# Patient Record
Sex: FEMALE | Race: WHITE | NOT HISPANIC OR LATINO | Employment: FULL TIME | URBAN - METROPOLITAN AREA
[De-identification: names, ages, dates, MRNs, and addresses within clinical notes are randomized per-mention and may not be internally consistent; named-entity substitution may affect disease eponyms.]

---

## 2020-10-15 PROBLEM — N91.1 SECONDARY AMENORRHEA: Status: ACTIVE | Noted: 2020-10-15

## 2020-10-15 PROBLEM — F41.9 ANXIETY: Status: ACTIVE | Noted: 2020-10-15

## 2020-11-05 PROBLEM — M54.9: Status: ACTIVE | Noted: 2020-11-05

## 2020-11-05 PROBLEM — O26.891: Status: ACTIVE | Noted: 2020-11-05

## 2020-11-09 PROBLEM — Z11.3 SCREENING FOR STDS (SEXUALLY TRANSMITTED DISEASES): Status: ACTIVE | Noted: 2020-11-09

## 2020-11-09 PROBLEM — N91.1 SECONDARY AMENORRHEA: Status: RESOLVED | Noted: 2020-10-15 | Resolved: 2020-11-09

## 2020-11-09 PROBLEM — Z34.01 ENCOUNTER FOR SUPERVISION OF NORMAL FIRST PREGNANCY IN FIRST TRIMESTER: Status: ACTIVE | Noted: 2020-11-09

## 2020-11-12 PROBLEM — O99.340 ANXIETY DURING PREGNANCY: Status: ACTIVE | Noted: 2020-10-15

## 2020-11-12 PROBLEM — Z3A.13 13 WEEKS GESTATION OF PREGNANCY: Status: ACTIVE | Noted: 2020-11-09

## 2020-11-30 PROBLEM — Z34.02 ENCOUNTER FOR PRENATAL CARE OF FIRST PREGNANCY, SECOND TRIMESTER: Status: ACTIVE | Noted: 2020-11-30

## 2020-12-30 PROBLEM — R41.841 COGNITIVE COMMUNICATION DEFICIT: Status: ACTIVE | Noted: 2019-09-18

## 2020-12-30 PROBLEM — F41.9 ANXIETY: Status: ACTIVE | Noted: 2020-12-30

## 2020-12-30 PROBLEM — Z3A.13 13 WEEKS GESTATION OF PREGNANCY: Status: RESOLVED | Noted: 2020-11-09 | Resolved: 2020-12-30

## 2020-12-30 PROBLEM — R41.841 COGNITIVE COMMUNICATION DEFICIT: Status: RESOLVED | Noted: 2019-09-18 | Resolved: 2020-12-30

## 2020-12-30 PROBLEM — G43.009 MIGRAINE WITHOUT AURA AND WITHOUT STATUS MIGRAINOSUS, NOT INTRACTABLE: Status: ACTIVE | Noted: 2019-11-25

## 2020-12-30 PROBLEM — G44.329 CHRONIC POST-TRAUMATIC HEADACHE, NOT INTRACTABLE: Status: ACTIVE | Noted: 2019-11-06

## 2020-12-30 PROBLEM — Z11.3 SCREENING FOR STDS (SEXUALLY TRANSMITTED DISEASES): Status: RESOLVED | Noted: 2020-11-09 | Resolved: 2020-12-30

## 2021-02-26 PROBLEM — Z34.03 ENCOUNTER FOR PRENATAL CARE IN THIRD TRIMESTER OF FIRST PREGNANCY: Status: ACTIVE | Noted: 2020-11-30

## 2021-05-24 PROBLEM — Z3A.40 40 WEEKS GESTATION OF PREGNANCY: Status: ACTIVE | Noted: 2021-05-24

## 2021-05-26 PROBLEM — Z98.891 S/P PRIMARY LOW TRANSVERSE C-SECTION: Status: ACTIVE | Noted: 2021-05-26

## 2021-09-23 PROBLEM — J01.40 ACUTE NON-RECURRENT PANSINUSITIS: Status: ACTIVE | Noted: 2021-09-23

## 2021-10-20 PROBLEM — R63.5 WEIGHT GAIN: Status: ACTIVE | Noted: 2021-10-20

## 2021-10-20 PROBLEM — Z34.03 ENCOUNTER FOR PRENATAL CARE IN THIRD TRIMESTER OF FIRST PREGNANCY: Status: RESOLVED | Noted: 2020-11-30 | Resolved: 2021-10-20

## 2022-03-09 PROBLEM — J06.9 UPPER RESPIRATORY INFECTION, ACUTE: Status: ACTIVE | Noted: 2022-03-09

## 2022-06-14 PROBLEM — J02.9 SORE THROAT: Status: ACTIVE | Noted: 2022-06-14

## 2022-09-20 NOTE — PATIENT INSTRUCTIONS
Nutrition Tips for Relief of Diarrhea   WHAT YOU NEED TO KNOW:   What are nutrition tips for relief of diarrhea? There are diet changes you can make to help relieve or stop diarrhea  These changes include limiting or avoiding foods and liquids that are high in sugar, fat, fiber, and lactose  Lactose is a sugar found in milk products  Milk products can cause diarrhea in people who are lactose intolerant  You should also drink extra liquids to replace fluids that are lost when you have diarrhea  Diarrhea can lead to dehydration  Which foods and liquids should I limit or avoid? Dairy:      Whole milk    Half-and-half, cream, and sour cream    Regular (whole milk) ice cream    Grains:      Whole wheat and whole grain breads, pasta, cereals, and crackers    Brown and wild rice    Breads and cereals with seeds or nuts    Popcorn    Fruit and vegetables: All raw fruits, except bananas and melon    Dried fruits, including prunes and raisins    Canned fruit in heavy syrup    Prune juice and any fruit juice with pulp    Raw vegetables, except lettuce     Fried vegetables    Corn, raw and cooked broccoli, cabbage, cauliflower, and brandt greens    Protein:      Fried meat, poultry, and fish    High-fat luncheon meats, such as bologna    Fatty meats, such as sausage, dodson, and hot dogs    Beans and nuts    Liquids:      Sodas and fruit-flavored drinks    Drinks that contain caffeine, such as energy drinks, coffee, and tea     Drinks that contain alcohol or sugar alcohol, such as sorbitol    Which foods and liquids may I eat and drink? Most people can tolerate the foods and liquids listed below  If any of them make your symptoms worse, stop eating or drinking them until you feel better  If you are lactose intolerant, avoid milk products    Dairy:      Skim or low-fat milk or evaporated milk    Soy milk or buttermilk     Low-fat, part-skim, and aged cheese    Yogurt, low-fat ice cream, or sherbert    Grains:  (Choose foods with less than 2 grams of dietary fiber per serving )     White or refined flour breads, bagels, pasta, and crackers    Cold or hot cereals made from white or refined flour such as puffed rice, cornflakes, or cream of wheat    White rice    Fruit and vegetables:      Bananas or melon    Fruit juice without pulp, except prune juice    Canned fruit in juice or light syrup    Lettuce and most well-cooked vegetables without seeds or skins     Strained vegetable juice    Protein:      Tender, well-cooked meat, poultry, or fish    Well-cooked eggs or soy foods (cooked without added fat)    Smooth nut butters    Fats:  (Limit fats to less than 8 teaspoons a day)     Oil, butter, or margarine, or mayonnaise    Cream cheese or salad dressings    Liquids: For infants, breast milk or formula    Oral rehydration solution     Decaffeinated coffee or caffeine-free teas    Soft drinks without caffeine    What other guidelines should I follow? Drink liquids as directed  You may need to drink more liquids than usual to prevent dehydration  Ask how much liquid to drink each day and which liquids are best for you  You may need to drink an oral rehydration solution (ORS)  An ORS helps replace fluids and electrolytes that you lose when you have diarrhea  Eat small meals or snacks every 3 to 4 hours  instead of large meals  Continue eating even if you still have diarrhea  Your diarrhea will continue for a few days but should gradually go away  CARE AGREEMENT:   You have the right to help plan your care  Discuss treatment options with your healthcare provider to decide what care you want to receive  You always have the right to refuse treatment  The above information is an  only  It is not intended as medical advice for individual conditions or treatments  Talk to your doctor, nurse or pharmacist before following any medical regimen to see if it is safe and effective for you    © Copyright [employer] 2022 Information is for End User's use only and may not be sold, redistributed or otherwise used for commercial purposes  All illustrations and images included in CareNotes® are the copyrighted property of [employer] M [employer] Inc  or [employer] [facility]  Pharyngitis   WHAT YOU NEED TO KNOW:   What is pharyngitis? Pharyngitis, or sore throat, is inflammation of the tissues and structures in your pharynx (throat)  Pharyngitis is most often caused by bacteria  It may also be caused by a cold or flu virus  Other causes include smoking, allergies, or acid reflux  What signs and symptoms may occur with pharyngitis? Sore throat or pain when you swallow    Fever, chills, and body aches    Hoarse or raspy voice    Cough, runny or stuffy nose, itchy or watery eyes    Headache    Upset stomach and loss of appetite    Mild neck stiffness    Swollen glands that feel like hard lumps when you touch your neck    White and yellow pus-filled blisters in the back of your throat    How is pharyngitis diagnosed? Tell your healthcare provider about your symptoms  He may look inside your throat and feel your neck  You may also need the following tests:  A throat culture  may show which germ is causing your sore throat  A cotton swab is rubbed against the back of your throat  Blood tests  may be used to show if another medical condition is causing your sore throat  How is pharyngitis treated? Viral pharyngitis will go away on its own without treatment  Your sore throat should start to feel better in 3 to 5 days for both viral and bacterial infections  You may need any of the following:  Antibiotics  treat a bacterial infection  NSAIDs , such as ibuprofen, help decrease swelling, pain, and fever  NSAIDs can cause stomach bleeding or kidney problems in certain people  If you take blood thinner medicine, always ask your healthcare provider if NSAIDs are safe for you  Always read the medicine label and follow directions      Acetaminophen decreases pain and fever  It is available without a doctor's order  Ask how much to take and how often to take it  Follow directions  Acetaminophen can cause liver damage if not taken correctly  How can I manage my symptoms? Gargle salt water  Mix ¼ teaspoon salt in an 8 ounce glass of warm water and gargle  This may help decrease swelling in your throat  Drink liquids as directed  You may need to drink more liquids than usual  Liquids may help soothe your throat and prevent dehydration  Ask how much liquid to drink each day and which liquids are best for you  Use a cool-steam humidifier  to help moisten the air in your room and decrease your cough  Soothe your throat  with cough drops, ice, soft foods, or popsicles  How can I prevent the spread of pharyngitis? Cover your mouth and nose when you cough or sneeze  Do not share food or drinks  Wash your hands often  Use soap and water  If soap and water are unavailable, use an alcohol based hand   Call 911 for any of the following: You have trouble breathing or swallowing because your throat is swollen or sore  When should I seek immediate care? You are drooling because it hurts too much to swallow  Your fever is higher than 102? F (39?C) or lasts longer than 3 days  You are confused  You taste blood in your throat  When should I contact my healthcare provider? Your throat pain gets worse  You have a painful lump in your throat that does not go away after 5 days  Your symptoms do not improve after 5 days  You have questions or concerns about your condition or care  CARE AGREEMENT:   You have the right to help plan your care  Learn about your health condition and how it may be treated  Discuss treatment options with your healthcare providers to decide what care you want to receive  You always have the right to refuse treatment  The above information is an  only   It is not intended as medical advice for individual conditions or treatments  Talk to your doctor, nurse or pharmacist before following any medical regimen to see if it is safe and effective for you  © Copyright [employer] 2022 Information is for End User's use only and may not be sold, redistributed or otherwise used for commercial purposes  All illustrations and images included in CareNotes® are the copyrighted property of [employer]  or [employer]   Migraine Headache   WHAT YOU NEED TO KNOW:   What is a migraine headache? A migraine is a severe headache  The pain can be so severe that it interferes with your daily activities  A migraine can last a few hours up to several days  The exact cause of migraines is not known  A family history of migraines increases your risk  Your risk is also higher if you are a woman or take medicines such as estrogen or a vasodilator  What are the warning signs that a migraine headache is about to start? Warning signs usually start 15 to 60 minutes before the headache:  Visual changes (auras), such as blurred vision, temporary blind or bright spots, lines, or hallucinations    Unusual tiredness or frequent yawning    Tingling in an arm or leg    What are the signs and symptoms of a migraine headache? A migraine headache usually begins as a dull ache around the eye or temple  The pain may get worse with movement  You may also have the following:  Pain in your head that may increase to the point that you cannot do everyday activities    Pain on one or both sides of your head    Throbbing, pulsing, or pounding pain in your head    Nausea and vomiting    Sensitivity to light, noise, or smells    What can trigger a migraine headache?    Stress, eye strain, oversleeping, or not getting enough sleep    Hormone changes in women from birth control pills, pregnancy, menopause, or during a monthly period    Skipping meals, going too long without eating, or not drinking enough liquids    Certain foods or drinks such as chocolate, hard cheese, alcohol, or drinks that contain caffeine    Foods that contain gluten, nitrates, MSG, or artificial sweeteners    Sunlight, bright or flashing lights, loud noises, smoke, or strong smells    Heat, humidity, or changes in the weather    How is a migraine headache diagnosed? Your healthcare provider will ask about your headaches  Describe the pain and any other symptoms, such as nausea  Tell the provider if you think anything triggered the pain  The provider will also want to know what you ate and drank before the pain started  Tell the provider about any medical conditions you have or that run in your family  Include any recent stressors you have had  You may also need any of the following:  A neurologic exam  is used to check how your pupils react to light  Your healthcare provider may check your memory, hand grasp, and balance  CT or MRI pictures  may be taken of your brain  You may be given contrast liquid to help your brain show up better in the pictures  Tell the healthcare provider if you have ever had an allergic reaction to contrast liquid  Do not enter the MRI room with anything metal  Metal can cause serious injury  Tell the healthcare provider if you have any metal in or on your body  How is a migraine headache treated? Migraines cannot be cured  The goal of treatment is to reduce your symptoms  Medicines  may be given to help you manage migraines  Take medicine as soon as you feel a migraine begin, or as directed  Your healthcare provider may recommend any of the following:    Migraine medicines  are used to help prevent or stop a migraine  NSAIDs  help decrease swelling and pain or fever  This medicine is available with or without a doctor's order  NSAIDs can cause stomach bleeding or kidney problems in certain people  If you take blood thinner medicine, always ask your healthcare provider if NSAIDs are safe for you   Always read the medicine label and follow directions  Acetaminophen  decreases pain and fever  It is available without a doctor's order  Ask how much to take and how often to take it  Follow directions  Read the labels of all other medicines you are using to see if they also contain acetaminophen, or ask your doctor or pharmacist  Acetaminophen can cause liver damage if not taken correctly  Do not use more than 4 grams (4,000 milligrams) total of acetaminophen in one day  Prescription pain medicine  may be given  Ask your healthcare provider how to take this medicine safely  Some prescription pain medicines contain acetaminophen  Do not take other medicines that contain acetaminophen without talking to your healthcare provider  Too much acetaminophen may cause liver damage  Prescription pain medicine may cause constipation  Ask your healthcare provider how to prevent or treat constipation  Antinausea medicine  may be given to calm your stomach and to help prevent vomiting  This medicine can also help relieve pain  Cognitive behavior therapy (CBT)  can help you learn ways to manage and prevent migraines  A therapist can teach you to relax and to reduce stress  You may learn ways to create healthy nutrition, activity, and sleep habits to prevent migraines  The therapist can also help you manage conditions that can affect migraines, such as anxiety or depression  What can I do to manage my symptoms? Rest in a dark, quiet room  This will help decrease your pain  Sleep may also help relieve the pain  Apply ice to decrease pain  Use an ice pack, or put crushed ice in a plastic bag  Cover the ice pack with a towel and place it on your head  Apply ice for 15 to 20 minutes every hour  Apply heat to decrease pain and muscle spasms  Use a small towel dampened with warm water or a heating pad, or sit in a warm bath  Apply heat on the area for 20 to 30 minutes every 2 hours  You may alternate heat and ice  Keep a migraine record  Write down when your migraines start and stop  Include your symptoms and what you were doing when a migraine began  Record what you ate or drank for 24 hours before the migraine started  Keep track of what you did to treat your migraine and if it worked  Bring the migraine record with you to visits with your healthcare provider  What can I do to prevent another migraine headache? Prevent a medicine overuse headache  Take pain medicines only as long as directed  A medicine may be limited to a certain amount each month  Your healthcare provider can help you create a plan so you get a safe amount each month  Do not smoke  Nicotine and other chemicals in cigarettes and cigars can trigger a migraine or make it worse  Ask your healthcare provider for information if you currently smoke and need help to quit  E-cigarettes or smokeless tobacco still contain nicotine  Talk to your healthcare provider before you use these products  Do not drink alcohol  Alcohol can trigger a migraine  It can also keep medicines used to treat your migraines from working  Be physically active  Physical activity, such as exercise, may help prevent migraines  Talk to your healthcare provider about the best activity plan for you  Try to get at least 30 minutes of physical activity on most days  Manage stress  Stress may trigger a migraine  Learn new ways to relax, such as deep breathing  Create a sleep schedule  Go to bed and get up at the same times each day  Do not watch television before bed  Eat a variety of healthy foods  Include healthy foods such as include fruit, vegetables, whole-grain breads, low-fat dairy products, beans, lean meat, and fish  Do not have food or drinks that trigger your migraines  Drink more liquids to prevent dehydration  Your healthcare provider can tell you how much liquid to drink each day and which liquids are best for you      Call your local emergency number (623 in the [place]) or have someone call if:   You feel like you are going to faint, you become confused, or you have a seizure  When should I seek immediate care? You have a headache that seems different or much worse than your usual migraine headache  You have a severe headache with a fever or a stiff neck  You have new problems with speech, vision, balance, or movement  When should I call my doctor? Your migraines interfere with your daily activities  Your medicines or treatments stop working  You have questions or concerns about your condition or care  CARE AGREEMENT:   You have the right to help plan your care  Learn about your health condition and how it may be treated  Discuss treatment options with your healthcare providers to decide what care you want to receive  You always have the right to refuse treatment  The above information is an  only  It is not intended as medical advice for individual conditions or treatments  Talk to your doctor, nurse or pharmacist before following any medical regimen to see if it is safe and effective for you  © Copyright [employer] 2022 Information is for End User's use only and may not be sold, redistributed or otherwise used for commercial purposes  All illustrations and images included in CareNotes® are the copyrighted property of A [employer] M , Inc  or [employer] [facility]  Acute Cough   WHAT YOU NEED TO KNOW:   What is an acute cough? An acute cough can last up to 3 weeks  Common causes of an acute cough include a cold, allergies, or a lung infection  How is the cause of an acute cough diagnosed? Your healthcare provider will examine you and listen to your lungs  Tell your healthcare provider if you cough up any mucus, or have a fever or shortness of breath  Also tell your provider what makes the cough better or worse  Depending on your symptoms, you may need a chest x-ray  A sample of mucus may be collected and tested for infection    How is an acute cough treated? An acute cough usually goes away on its own  Ask your healthcare provider about medicines you can take to decrease your cough  You may need medicine to stop the cough, decrease swelling in your airways, or help open your airways  Medicine may also be given to help you cough up mucus  If you have an infection caused by bacteria, you may need antibiotics  What can I do to manage my cough? Do not smoke and stay away from others who smoke  Nicotine and other chemicals in cigarettes and cigars can cause lung damage and make your cough worse  Ask your healthcare provider for information if you currently smoke and need help to quit  E-cigarettes or smokeless tobacco still contain nicotine  Talk to your healthcare provider before you use these products  Drink extra liquids as directed  Liquids will help thin and loosen mucus so you can cough it up  Liquids will also help prevent dehydration  Examples of good liquids to drink include water, fruit juice, and broth  Do not drink liquids that contain caffeine  Caffeine can increase your risk for dehydration  Ask your healthcare provider how much liquid to drink each day  Rest as directed  Do not do activities that make your cough worse, such as exercise  Use a humidifier or vaporizer  Use a cool mist humidifier or a vaporizer to increase air moisture in your home  This may make it easier for you to breathe and help decrease your cough  Eat 2 to 5 mL of honey 2 times each day  Honey can help thin mucus and decrease your cough  Use cough drops or lozenges  These can help decrease throat irritation and your cough  When should I seek immediate care? You have trouble breathing or feel short of breath  You cough up blood, or you see blood in your mucus  You faint or feel weak or dizzy  You have chest pain when you cough or take a deep breath  You have new wheezing  When should I contact my healthcare provider?    You have a fever  Your cough lasts longer than 4 weeks  Your symptoms do not improve with treatment  You have questions or concerns about your condition or care  CARE AGREEMENT:   You have the right to help plan your care  Learn about your health condition and how it may be treated  Discuss treatment options with your healthcare providers to decide what care you want to receive  You always have the right to refuse treatment  The above information is an  only  It is not intended as medical advice for individual conditions or treatments  Talk to your doctor, nurse or pharmacist before following any medical regimen to see if it is safe and effective for you  © Copyright [employer] 2022 Information is for End User's use only and may not be sold, redistributed or otherwise used for commercial purposes  All illustrations and images included in CareNotes® are the copyrighted property of [employer]  or [facility]  Sinusitis   WHAT YOU NEED TO KNOW:   What is sinusitis? Sinusitis is inflammation or infection of your sinuses  Sinusitis is most often caused by a virus  Acute sinusitis may last up to 12 weeks  Chronic sinusitis lasts longer than 12 weeks  Recurrent sinusitis means you have 4 or more infections in 1 year  What increases my risk for sinusitis? Medical conditions, such as an upper respiratory infection, allergies, asthma, or cystic fibrosis    Dental infections or procedures, such as gum infections, tooth decay, or a root canal    Smoking or exposure to secondhand smoke    Abnormal sinus structure, such as nasal growths, swollen tonsils, or a deviated septum    A weak immune system, from diseases such as diabetes or HIV    What are the signs and symptoms of sinusitis?    Fever    Pain, pressure, redness, or swelling around the forehead, cheeks, or eyes    Thick yellow or green discharge from your nose    Tenderness when you touch your face over your sinuses    Dry cough that happens mostly at night or when you lie down    Headache and face pain that is worse when you lean forward    Tooth pain, or pain when you chew    How is sinusitis diagnosed? Your healthcare provider will examine you and ask about your symptoms  He or she may check inside your nose using a nasal speculum  You may need any of the following tests:  A sample of mucus from your nose  may show what germ is causing your infection  A CT or MRI of your head  may show the mucous lining of your sinuses  You may be given contrast liquid to help your sinuses show up better in the pictures  Tell your healthcare provider if you have ever had an allergic reaction to contrast liquid  Do not enter the MRI room with anything metal  Metal can cause serious injury  Tell your healthcare provider if you have any metal in or on your body  How is sinusitis treated? Your symptoms may go away on their own  Your healthcare provider may recommend watchful waiting for up to 10 days before starting antibiotics  You may need any of the following:  Acetaminophen  decreases pain and fever  It is available without a doctor's order  Ask how much to take and how often to take it  Follow directions  Read the labels of all other medicines you are using to see if they also contain acetaminophen, or ask your doctor or pharmacist  Acetaminophen can cause liver damage if not taken correctly  Do not use more than 4 grams (4,000 milligrams) total of acetaminophen in one day  NSAIDs , such as ibuprofen, help decrease swelling, pain, and fever  This medicine is available with or without a doctor's order  NSAIDs can cause stomach bleeding or kidney problems in certain people  If you take blood thinner medicine, always ask your healthcare provider if NSAIDs are safe for you  Always read the medicine label and follow directions  Nasal steroid sprays  may help decrease inflammation in your nose and sinuses      Decongestants  help reduce swelling and drain mucus in the nose and sinuses  They may help you breathe easier  Antihistamines  help dry mucus in the nose and relieve sneezing  Antibiotics  help treat or prevent a bacterial infection  How can I manage my symptoms? Rinse your sinuses as directed  Use a sinus rinse device to rinse your nasal passages with a saline (salt water) solution or distilled water  Do not use tap water  This will help thin the mucus in your nose and rinse away pollen and dirt  It will also help reduce swelling so you can breathe normally  Use a humidifier  to increase air moisture in your home  This may make it easier for you to breathe and help decrease your cough  Sleep with your head elevated  Place an extra pillow under your head before you go to sleep to help your sinuses drain  Drink liquids as directed  Ask your healthcare provider how much liquid to drink each day and which liquids are best for you  Liquids will thin the mucus in your nose and help it drain  Avoid drinks that contain alcohol or caffeine  Do not smoke, and avoid secondhand smoke  Nicotine and other chemicals in cigarettes and cigars can make your symptoms worse  Ask your healthcare provider for information if you currently smoke and need help to quit  E-cigarettes or smokeless tobacco still contain nicotine  Talk to your healthcare provider before you use these products  How can I help prevent the spread of germs? Wash your hands often with soap and water  Wash your hands after you use the bathroom, change a child's diaper, or sneeze  Wash your hands before you prepare or eat food  Stay away from people who are sick  Some germs spread easily and quickly through contact  When should I seek immediate care? You have trouble breathing or wheezing that is getting worse  You have a stiff neck, a fever, or a bad headache  You cannot open your eye       Your eyeball bulges out or you cannot move your eye  You are more sleepy than normal, or you notice changes in your ability to think, move, or talk  You have swelling of your forehead or scalp  When should I call my doctor? You have vision changes, such as double vision  Your eye and eyelid are red, swollen, and painful  Your symptoms do not improve or go away after 10 days  You have nausea and are vomiting  Your nose is bleeding  You have questions or concerns about your condition or care  CARE AGREEMENT:   You have the right to help plan your care  Learn about your health condition and how it may be treated  Discuss treatment options with your healthcare providers to decide what care you want to receive  You always have the right to refuse treatment  The above information is an  only  It is not intended as medical advice for individual conditions or treatments  Talk to your doctor, nurse or pharmacist before following any medical regimen to see if it is safe and effective for you  © Copyright [employer] 2022 Information is for End User's use only and may not be sold, redistributed or otherwise used for commercial purposes   All illustrations and images included in CareNotes® are the copyrighted property of A D A M , Inc  or [address] [place] [address]

## 2022-09-20 NOTE — PROGRESS NOTES
BMI Counseling: Body mass index is 29 8 kg/m²  The BMI is above normal  Nutrition recommendations include decreasing portion sizes, encouraging healthy choices of fruits and vegetables, decreasing fast food intake, consuming healthier snacks, limiting drinks that contain sugar, moderation in carbohydrate intake, increasing intake of lean protein, reducing intake of saturated and trans fat and reducing intake of cholesterol  Exercise recommendations include vigorous physical activity 75 minutes/week, exercising 3-5 times per week, obtaining a gym membership and strength training exercises  No pharmacotherapy was ordered  Rationale for BMI follow-up plan is due to patient being overweight or obese  Depression Screening and Follow-up Plan: Patient was screened for depression during today's encounter  They screened negative with a PHQ-2 score of 0  Assessment/Plan:         Problem List Items Addressed This Visit        Respiratory    Acute recurrent ethmoidal sinusitis     Doxycycline 100mg p o  bid x 10 days  Use Flonase as directed  Other    Diarrhea     With encouraged patient to use Imodium as needed over-the-counter for symptoms of diarrhea  Sore throat - Primary     Rapid strep done in house  Patient has history of strep  Patient to do warm salt water gargles, throat lozenges and warm tea and honey  Relevant Orders    POCT rapid strepA    Cough     OTC cough and cold medication as needed  Patient to use her inhaler as directed every 4-6 hours as needed for cough  Acute nonintractable headache     Tylenol and advil prn headache symptoms  Seasonal allergies     Patient to use Claritin 10 mg OTC daily during change of season  Subjective:      Patient ID: Ingrid Conrad is a 40 y o  female      Patient is a 40year old female present for cough, sore throat, nasal congestion, sinus Problem, sinus pain and pressure started about 1 5 weeks ago with headache  The following portions of the patient's history were reviewed and updated as appropriate:   Past Medical History:  She has a past medical history of Anemia, Concussion, Migraine, Urinary tract infection, and Varicella  ,  _______________________________________________________________________  Medical Problems:  does not have any pertinent problems on file ,  _______________________________________________________________________  Past Surgical History:   has a past surgical history that includes Finger surgery (Left, ); [place] tooth extraction (Bilateral); and pr  delivery only (N/A, 2021)  ,  _______________________________________________________________________  Family History:  family history includes Dementia in her maternal grandmother; Diabetes type II in her father; Heart attack in her paternal grandmother; Hyperlipidemia in her mother; No Known Problems in her sister; Stroke in her father; Transient ischemic attack in her father ,  _______________________________________________________________________  Social History:   reports that she has never smoked  She has never used smokeless tobacco  She reports current alcohol use  She reports that she does not use drugs  ,  _______________________________________________________________________  Allergies:  is allergic to penicillins     _______________________________________________________________________  Current Outpatient Medications   Medication Sig Dispense Refill    albuterol (PROVENTIL HFA,VENTOLIN HFA) 90 mcg/act inhaler INHALE 2 PUFFS EVERY 6 HOURS AS NEEDED FOR WHEEZING OR SHORTNESS OF BREATH FOR UP TO 30 DAYS        Etonogestrel (NEXPLANON SC) Inject under the skin      fluticasone (FLONASE) 50 mcg/act nasal spray 1 spray into each nostril daily for 10 days 18 mL 3    sertraline (ZOLOFT) 50 mg tablet TAKE 1 AND 1/2 TABLETS BY MOUTH DAILY 135 tablet 1    famotidine (PEPCID) 20 mg tablet Take 1 tablet (20 mg total) by mouth 2 (two) times a day for 2 days (Patient not taking: Reported on 6/14/2022) 4 tablet 0    ondansetron (ZOFRAN) 4 mg tablet Take 1 tablet (4 mg total) by mouth every 6 (six) hours for 2 days (Patient not taking: Reported on 6/14/2022) 8 tablet 0    sucralfate (CARAFATE) 1 g tablet Take 1 tablet (1 g total) by mouth 4 (four) times a day for 2 days (Patient not taking: Reported on 6/14/2022) 8 tablet 0     No current facility-administered medications for this visit      _______________________________________________________________________  Review of Systems   Constitutional: Positive for fever  Negative for activity change, appetite change, chills, fatigue and unexpected weight change  Low-grade fever in the 100s  HENT: Positive for congestion, postnasal drip, rhinorrhea, sinus pressure, sinus pain and sore throat  Negative for ear discharge, ear pain, nosebleeds, sneezing and voice change  Nasal congestion  Eyes: Negative for pain, redness and visual disturbance  Respiratory: Positive for cough  Negative for chest tightness, shortness of breath and wheezing  Cardiovascular: Negative for chest pain and palpitations  Gastrointestinal: Negative for abdominal distention, abdominal pain, constipation, diarrhea, nausea and vomiting  Endocrine: Negative  Genitourinary: Negative for difficulty urinating, dysuria, flank pain, frequency, hematuria and urgency  Musculoskeletal: Negative for arthralgias and myalgias  Skin: Negative  Allergic/Immunologic: Negative  Seasonal allergies  Neurological: Negative  Hematological: Negative  Psychiatric/Behavioral: Negative  Objective:  Vitals:    09/21/22 0933   BP: 116/66   BP Location: Left arm   Patient Position: Sitting   Cuff Size: Standard   Pulse: 68   Resp: 18   Temp: 97 5 °F (36 4 °C)   TempSrc: Temporal   SpO2: 98%   Weight: 88 9 kg (196 lb)   Height: 5' 8" (1 727 m)     Body mass index is 29 8 kg/m²  Physical Exam  Vitals and nursing note reviewed  Constitutional:       Appearance: Normal appearance  She is well-developed  Comments: Overweight  HENT:      Head: Normocephalic and atraumatic  Right Ear: Tympanic membrane, ear canal and external ear normal       Left Ear: Tympanic membrane, ear canal and external ear normal       Nose: Congestion and rhinorrhea present  Mouth/Throat:      Mouth: Mucous membranes are moist  No oral lesions  Pharynx: Pharyngeal swelling and posterior oropharyngeal erythema present  No oropharyngeal exudate or uvula swelling  Tonsils: No tonsillar exudate or tonsillar abscesses  3+ on the right  3+ on the left  Eyes:      Extraocular Movements: Extraocular movements intact  Conjunctiva/sclera: Conjunctivae normal       Pupils: Pupils are equal, round, and reactive to light  Cardiovascular:      Rate and Rhythm: Normal rate and regular rhythm  Pulses: Normal pulses  Heart sounds: Normal heart sounds  No murmur heard  Pulmonary:      Effort: Pulmonary effort is normal       Breath sounds: Normal breath sounds  Abdominal:      General: Bowel sounds are normal       Palpations: Abdomen is soft  Musculoskeletal:         General: Normal range of motion  Cervical back: Normal range of motion  Skin:     General: Skin is warm  Neurological:      General: No focal deficit present  Mental Status: She is alert and oriented to person, place, and time     Psychiatric:         Mood and Affect: Mood normal          Behavior: Behavior normal

## 2022-09-21 ENCOUNTER — OFFICE VISIT (OUTPATIENT)
Dept: FAMILY MEDICINE CLINIC | Facility: CLINIC | Age: 37
End: 2022-09-21
Payer: COMMERCIAL

## 2022-09-21 VITALS
HEART RATE: 68 BPM | BODY MASS INDEX: 29.7 KG/M2 | WEIGHT: 196 LBS | RESPIRATION RATE: 18 BRPM | HEIGHT: 68 IN | SYSTOLIC BLOOD PRESSURE: 116 MMHG | DIASTOLIC BLOOD PRESSURE: 66 MMHG | TEMPERATURE: 97.5 F | OXYGEN SATURATION: 98 %

## 2022-09-21 DIAGNOSIS — R51.9 ACUTE NONINTRACTABLE HEADACHE, UNSPECIFIED HEADACHE TYPE: ICD-10-CM

## 2022-09-21 DIAGNOSIS — J01.21 ACUTE RECURRENT ETHMOIDAL SINUSITIS: ICD-10-CM

## 2022-09-21 DIAGNOSIS — J02.9 SORE THROAT: Primary | ICD-10-CM

## 2022-09-21 DIAGNOSIS — R05.9 COUGH: ICD-10-CM

## 2022-09-21 DIAGNOSIS — R19.7 DIARRHEA, UNSPECIFIED TYPE: ICD-10-CM

## 2022-09-21 DIAGNOSIS — J30.2 SEASONAL ALLERGIES: ICD-10-CM

## 2022-09-21 LAB — S PYO AG THROAT QL: NEGATIVE

## 2022-09-21 PROCEDURE — 3725F SCREEN DEPRESSION PERFORMED: CPT | Performed by: NURSE PRACTITIONER

## 2022-09-21 PROCEDURE — 99215 OFFICE O/P EST HI 40 MIN: CPT | Performed by: NURSE PRACTITIONER

## 2022-09-21 PROCEDURE — 87880 STREP A ASSAY W/OPTIC: CPT | Performed by: NURSE PRACTITIONER

## 2022-09-21 NOTE — ASSESSMENT & PLAN NOTE
Rapid strep done in house  Patient has history of strep  Patient to do warm salt water gargles, throat lozenges and warm tea and honey

## 2022-09-21 NOTE — ASSESSMENT & PLAN NOTE
OTC cough and cold medication as needed  Patient to use her inhaler as directed every 4-6 hours as needed for cough

## 2022-10-11 PROBLEM — J06.9 UPPER RESPIRATORY INFECTION, ACUTE: Status: RESOLVED | Noted: 2022-03-09 | Resolved: 2022-10-11

## 2022-10-11 PROBLEM — R05.9 COUGH: Status: RESOLVED | Noted: 2022-06-14 | Resolved: 2022-10-11

## 2022-10-12 PROBLEM — J01.40 ACUTE NON-RECURRENT PANSINUSITIS: Status: RESOLVED | Noted: 2021-09-23 | Resolved: 2022-10-12
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
## 2022-10-24 ENCOUNTER — CONSULT (OUTPATIENT)
Dept: PAIN MEDICINE | Facility: CLINIC | Age: 37
End: 2022-10-24
Payer: COMMERCIAL

## 2022-10-24 VITALS
SYSTOLIC BLOOD PRESSURE: 122 MMHG | BODY MASS INDEX: 30.49 KG/M2 | HEART RATE: 98 BPM | DIASTOLIC BLOOD PRESSURE: 72 MMHG | HEIGHT: 68 IN | WEIGHT: 201.2 LBS

## 2022-10-24 DIAGNOSIS — G44.86 CERVICOGENIC HEADACHE: ICD-10-CM

## 2022-10-24 DIAGNOSIS — M54.81 BILATERAL OCCIPITAL NEURALGIA: Primary | ICD-10-CM

## 2022-10-24 DIAGNOSIS — M79.18 MYOFASCIAL PAIN SYNDROME: ICD-10-CM

## 2022-10-24 DIAGNOSIS — F07.81 POST CONCUSSION SYNDROME: ICD-10-CM

## 2022-10-24 PROCEDURE — 99244 OFF/OP CNSLTJ NEW/EST MOD 40: CPT | Performed by: STUDENT IN AN ORGANIZED HEALTH CARE EDUCATION/TRAINING PROGRAM

## 2022-11-15 ENCOUNTER — APPOINTMENT (EMERGENCY)
Dept: RADIOLOGY | Facility: HOSPITAL | Age: 37
End: 2022-11-15

## 2022-11-15 ENCOUNTER — HOSPITAL ENCOUNTER (EMERGENCY)
Facility: HOSPITAL | Age: 37
Discharge: HOME | End: 2022-11-15
Attending: EMERGENCY MEDICINE

## 2022-11-15 VITALS
TEMPERATURE: 98 F | OXYGEN SATURATION: 97 % | WEIGHT: 201.94 LBS | DIASTOLIC BLOOD PRESSURE: 72 MMHG | BODY MASS INDEX: 30.61 KG/M2 | RESPIRATION RATE: 20 BRPM | HEART RATE: 67 BPM | HEIGHT: 68 IN | SYSTOLIC BLOOD PRESSURE: 112 MMHG

## 2022-11-15 DIAGNOSIS — R07.9 CHEST PAIN: Primary | ICD-10-CM

## 2022-11-15 LAB
ALBUMIN SERPL BCP-MCNC: 4.2 G/DL (ref 3.5–5)
ALP SERPL-CCNC: 48 U/L (ref 34–104)
ALT SERPL W P-5'-P-CCNC: 19 U/L (ref 7–52)
ANION GAP SERPL CALCULATED.3IONS-SCNC: 7 MMOL/L (ref 4–13)
AST SERPL W P-5'-P-CCNC: 18 U/L (ref 13–39)
BASOPHILS # BLD AUTO: 0.05 THOUSANDS/ÂΜL (ref 0–0.1)
BASOPHILS NFR BLD AUTO: 1 % (ref 0–1)
BILIRUB SERPL-MCNC: 0.79 MG/DL (ref 0.2–1)
BUN SERPL-MCNC: 13 MG/DL (ref 5–25)
CALCIUM SERPL-MCNC: 9.2 MG/DL (ref 8.4–10.2)
CARDIAC TROPONIN I PNL SERPL HS: <2 NG/L
CARDIAC TROPONIN I PNL SERPL HS: <2 NG/L
CHLORIDE SERPL-SCNC: 109 MMOL/L (ref 96–108)
CO2 SERPL-SCNC: 22 MMOL/L (ref 21–32)
CREAT SERPL-MCNC: 0.86 MG/DL (ref 0.6–1.3)
EOSINOPHIL # BLD AUTO: 0.18 THOUSAND/ÂΜL (ref 0–0.61)
EOSINOPHIL NFR BLD AUTO: 3 % (ref 0–6)
ERYTHROCYTE [DISTWIDTH] IN BLOOD BY AUTOMATED COUNT: 14.1 % (ref 11.6–15.1)
EXT PREG TEST URINE: NEGATIVE
EXT. CONTROL ED NAV: NORMAL
GFR SERPL CREATININE-BSD FRML MDRD: 87 ML/MIN/1.73SQ M
GLUCOSE SERPL-MCNC: 101 MG/DL (ref 65–140)
HCT VFR BLD AUTO: 40.7 % (ref 34.8–46.1)
HGB BLD-MCNC: 13.6 G/DL (ref 11.5–15.4)
IMM GRANULOCYTES # BLD AUTO: 0.02 THOUSAND/UL (ref 0–0.2)
IMM GRANULOCYTES NFR BLD AUTO: 0 % (ref 0–2)
LIPASE SERPL-CCNC: 35 U/L (ref 11–82)
LYMPHOCYTES # BLD AUTO: 1.73 THOUSANDS/ÂΜL (ref 0.6–4.47)
LYMPHOCYTES NFR BLD AUTO: 27 % (ref 14–44)
MCH RBC QN AUTO: 29.6 PG (ref 26.8–34.3)
MCHC RBC AUTO-ENTMCNC: 33.4 G/DL (ref 31.4–37.4)
MCV RBC AUTO: 89 FL (ref 82–98)
MONOCYTES # BLD AUTO: 0.36 THOUSAND/ÂΜL (ref 0.17–1.22)
MONOCYTES NFR BLD AUTO: 6 % (ref 4–12)
NEUTROPHILS # BLD AUTO: 4.08 THOUSANDS/ÂΜL (ref 1.85–7.62)
NEUTS SEG NFR BLD AUTO: 63 % (ref 43–75)
NRBC BLD AUTO-RTO: 0 /100 WBCS
PLATELET # BLD AUTO: 228 THOUSANDS/UL (ref 149–390)
PMV BLD AUTO: 10.3 FL (ref 8.9–12.7)
POTASSIUM SERPL-SCNC: 4.3 MMOL/L (ref 3.5–5.3)
PROT SERPL-MCNC: 7.7 G/DL (ref 6.4–8.4)
RBC # BLD AUTO: 4.6 MILLION/UL (ref 3.81–5.12)
SODIUM SERPL-SCNC: 138 MMOL/L (ref 135–147)
WBC # BLD AUTO: 6.42 THOUSAND/UL (ref 4.31–10.16)

## 2022-11-15 RX ORDER — KETOROLAC TROMETHAMINE 30 MG/ML
15 INJECTION, SOLUTION INTRAMUSCULAR; INTRAVENOUS ONCE
Status: COMPLETED | OUTPATIENT
Start: 2022-11-15 | End: 2022-11-15

## 2022-11-15 RX ORDER — ACETAMINOPHEN 325 MG/1
650 TABLET ORAL ONCE
Status: COMPLETED | OUTPATIENT
Start: 2022-11-15 | End: 2022-11-15

## 2022-11-15 RX ADMIN — KETOROLAC TROMETHAMINE 15 MG: 30 INJECTION, SOLUTION INTRAMUSCULAR at 11:05

## 2022-11-15 RX ADMIN — ACETAMINOPHEN 650 MG: 325 TABLET ORAL at 09:31

## 2022-11-15 NOTE — ED PROVIDER NOTES
History  Chief Complaint   Patient presents with   • Chest Pain     Started around 2am  Has SOB with it  Had pain down L arm and up into back of neck  This is a 15-year-old female with a history of panic disorder, presenting to the ED today for complaint of chest pain  Patient states her chest pain woke her upper at 2:00 a m , at she went back to sleep, and he got up again at 7 with persistent pain  Patient states that her pain is constant, nonradiating, sharp in nature, 5 to 6/10 in intensity, with some associated shortness of breath  She denies any nausea, vomiting, abdominal pain, diarrhea, constipation, focal weakness, focal numbness or tingling, or any other significantly related symptoms  Patient states that her pain went away, and then returned again just prior to her arrival   Patient has never had issues like this before  She has had panic attacks, but she states this feels different from her panic attacks previously  Prior to Admission Medications   Prescriptions Last Dose Informant Patient Reported? Taking? Etonogestrel (NEXPLANON SC)  Self Yes No   Sig: Inject under the skin   albuterol (PROVENTIL HFA,VENTOLIN HFA) 90 mcg/act inhaler  Self Yes No   Sig: INHALE 2 PUFFS EVERY 6 HOURS AS NEEDED FOR WHEEZING OR SHORTNESS OF BREATH FOR UP TO 30 DAYS     Patient not taking: Reported on 10/24/2022   famotidine (PEPCID) 20 mg tablet   No No   Sig: Take 1 tablet (20 mg total) by mouth 2 (two) times a day for 2 days   Patient not taking: Reported on 2022   fluticasone (FLONASE) 50 mcg/act nasal spray   No No   Si spray into each nostril daily for 10 days   ondansetron (ZOFRAN) 4 mg tablet   No No   Sig: Take 1 tablet (4 mg total) by mouth every 6 (six) hours for 2 days   Patient not taking: Reported on 2022   sertraline (ZOLOFT) 50 mg tablet  Self No No   Sig: TAKE 1 AND 1/2 TABLETS BY MOUTH DAILY   sucralfate (CARAFATE) 1 g tablet   No No   Sig: Take 1 tablet (1 g total) by mouth 4 (four) times a day for 2 days   Patient not taking: Reported on 2022      Facility-Administered Medications: None       Past Medical History:   Diagnosis Date   • Anemia     20 years ago   • Concussion    • Migraine    • Urinary tract infection     In the past    • Varicella        Past Surgical History:   Procedure Laterality Date   • FINGER SURGERY Left    • [place]  DELIVERY ONLY N/A 2021    Procedure:  SECTION (); Surgeon: Freddie Gamboa MD;  Location: [facility] ;  Service: Obstetrics   • WISDOM TOOTH EXTRACTION Bilateral        Family History   Problem Relation Age of Onset   • Hyperlipidemia Mother    • Transient ischemic attack Father    • Diabetes type II Father    • Stroke Father         mini stroke   • Dementia Maternal Grandmother    • Heart attack Paternal Grandmother    • No Known Problems Sister      I have reviewed and agree with the history as documented  E-Cigarette/Vaping   • E-Cigarette Use Never User      E-Cigarette/Vaping Substances   • Nicotine No    • THC No    • CBD No    • Flavoring No    • Other No    • Unknown No      Social History     Tobacco Use   • Smoking status: Never Smoker   • Smokeless tobacco: Never Used   Vaping Use   • Vaping Use: Never used   Substance Use Topics   • Alcohol use: Yes   • Drug use: Never       Review of Systems   Constitutional: Negative for activity change, chills and fever  HENT: Negative for congestion and rhinorrhea  Eyes: Negative for photophobia and visual disturbance  Respiratory: Negative for cough, chest tightness and shortness of breath  Cardiovascular: Positive for chest pain  Negative for leg swelling  Gastrointestinal: Negative for abdominal distention, nausea and vomiting  Genitourinary: Negative for dysuria and frequency  Musculoskeletal: Negative for back pain and neck stiffness  Skin: Negative for rash and wound  Neurological: Negative for dizziness and weakness  Psychiatric/Behavioral: Negative for agitation and suicidal ideas  Physical Exam  Physical Exam  Vitals and nursing note reviewed  Constitutional:       General: She is not in acute distress  Appearance: Normal appearance  She is normal weight  She is not ill-appearing or toxic-appearing  HENT:      Head: Normocephalic and atraumatic  Right Ear: External ear normal       Left Ear: External ear normal       Nose: Nose normal  No congestion or rhinorrhea  Mouth/Throat:      Mouth: Mucous membranes are moist       Pharynx: Oropharynx is clear  No oropharyngeal exudate  Eyes:      General: No scleral icterus  Conjunctiva/sclera: Conjunctivae normal    Cardiovascular:      Rate and Rhythm: Normal rate and regular rhythm  Pulses: Normal pulses  Heart sounds: Normal heart sounds  No murmur heard  No gallop  Pulmonary:      Effort: Pulmonary effort is normal  No respiratory distress  Breath sounds: Normal breath sounds  No stridor  No decreased breath sounds, wheezing or rhonchi  Abdominal:      General: Abdomen is flat  Bowel sounds are normal  There is no distension  Palpations: Abdomen is soft  There is no mass  Tenderness: There is no abdominal tenderness  Musculoskeletal:         General: No swelling or tenderness  Normal range of motion  Cervical back: Normal range of motion and neck supple  No tenderness  Right lower leg: No edema  Left lower leg: No edema  Skin:     General: Skin is warm and dry  Capillary Refill: Capillary refill takes less than 2 seconds  Coloration: Skin is not jaundiced  Findings: No bruising  Neurological:      General: No focal deficit present  Mental Status: She is alert and oriented to person, place, and time  Mental status is at baseline  Sensory: No sensory deficit  Motor: No weakness     Psychiatric:         Mood and Affect: Mood normal          Behavior: Behavior normal  Thought Content: Thought content normal          Judgment: Judgment normal          Vital Signs  ED Triage Vitals   Temperature Pulse Respirations Blood Pressure SpO2   11/15/22 0841 11/15/22 0838 11/15/22 0838 11/15/22 0838 11/15/22 0838   98 °F (36 7 °C) 85 20 121/88 97 %      Temp Source Heart Rate Source Patient Position - Orthostatic VS BP Location FiO2 (%)   11/15/22 0841 11/15/22 0838 11/15/22 0838 11/15/22 0838 --   Oral Monitor Sitting Left arm       Pain Score       11/15/22 0838       5           Vitals:    11/15/22 0838   BP: 121/88   Pulse: 85   Patient Position - Orthostatic VS: Sitting         Visual Acuity      ED Medications  Medications - No data to display    Diagnostic Studies  Results Reviewed     Procedure Component Value Units Date/Time    CBC and differential [[id number]] Collected: 11/15/22 0849    Lab Status: Final result Specimen: Blood from Arm, Right Updated: 11/15/22 0858     WBC 6 42 Thousand/uL      RBC 4 60 Million/uL      Hemoglobin 13 6 g/dL      Hematocrit 40 7 %      MCV 89 fL      MCH 29 6 pg      MCHC 33 4 g/dL      RDW 14 1 %      MPV 10 3 fL      Platelets 922 Thousands/uL      nRBC 0 /100 WBCs      Neutrophils Relative 63 %      Immat GRANS % 0 %      Lymphocytes Relative 27 %      Monocytes Relative 6 %      Eosinophils Relative 3 %      Basophils Relative 1 %      Neutrophils Absolute 4 08 Thousands/µL      Immature Grans Absolute 0 02 Thousand/uL      Lymphocytes Absolute 1 73 Thousands/µL      Monocytes Absolute 0 36 Thousand/µL      Eosinophils Absolute 0 18 Thousand/µL      Basophils Absolute 0 05 Thousands/µL     HS Troponin 0hr (reflex protocol) [[id number]] Collected: 11/15/22 0849    Lab Status: In process Specimen: Blood from Arm, Right Updated: 11/15/22 0853    Comprehensive metabolic panel [[id number]] Collected: 11/15/22 0849    Lab Status:  In process Specimen: Blood from Arm, Right Updated: 11/15/22 0852    [place] draw [[id number]] Collected: 11/15/22 0849 Lab Status: In process Specimen: Blood from Arm, Right Updated: 11/15/22 9910    Narrative: The following orders were created for panel order [place] draw  Procedure                               Abnormality         Status                     ---------                               -----------         ------                     Fer Daniels top on NQ[[id number]]                                 In process                   Please view results for these tests on the individual orders  XR chest 1 view portable    (Results Pending)              Procedures  Procedures         ED Course                                             MDM  Number of Diagnoses or Management Options  Chest pain  Diagnosis management comments: This is a 72-year-old female presenting to the ED today for a complaint of chest pain  Her chest pain is left-sided, with associated shortness of breath  She denies any other significantly related symptoms  Her physical exam otherwise is unremarkable  Her differential diagnosis includes:  Panic attack versus ACS versus pneumonia versus other  She had a heart score of 3  Her EKG did not show any significantly acutely ischemic findings  Her troponin was normal x2  Her chest x-ray did not show any significant abnormalities  Her labs also did not show any significant abnormalities  Patient received Toradol for her symptoms, which significantly improved her pain  Patient was given outpatient Cardiology referral, the likely has musculoskeletal etiology of her pain  With her history, heart score of 3, she still likely this service stress testing as an outpatient  Patient was given strict return precautions with which she agreed to comply  She was discharged home in stable condition and felt safe going home        Disposition  Final diagnoses:   None     ED Disposition     None      Follow-up Information    None         Patient's Medications   Discharge Prescriptions    No medications on file       No discharge procedures on file      PDMP Review       Value Time User    PDMP Reviewed  Yes 10/24/2022  2:40 PM DO Caio          ED Provider  Electronically Signed by           Bebe Miller MD  11/15/22 9871

## 2022-11-15 NOTE — DISCHARGE INSTRUCTIONS
You were seen in the ED for chest pain  You had bloodwork, EKG, chest xrays, all showing no significant abnormalities  Please follow up with your primary care physician and/or cardiologist within the next 1-2 weeks for continued management of your condition  Please come back to the ED if your symptoms return or worsen or if you develop uncontrollable pain, shortness fo breath  Thank you very much for utilizing the ED this afternoon

## 2022-11-16 LAB
ATRIAL RATE: 71 BPM
P AXIS: 41 DEGREES
PR INTERVAL: 130 MS
QRS AXIS: 53 DEGREES
QRSD INTERVAL: 74 MS
QT INTERVAL: 402 MS
QTC INTERVAL: 436 MS
T WAVE AXIS: 49 DEGREES
VENTRICULAR RATE: 71 BPM

## 2022-11-20 PROBLEM — J01.21 ACUTE RECURRENT ETHMOIDAL SINUSITIS: Status: RESOLVED | Noted: 2022-09-21 | Resolved: 2022-11-20

## 2022-11-21 ENCOUNTER — TELEPHONE (OUTPATIENT)
Dept: NEUROLOGY | Facility: CLINIC | Age: 37
End: 2022-11-21

## 2022-12-06 ENCOUNTER — OFFICE VISIT (OUTPATIENT)
Dept: CARDIOLOGY CLINIC | Facility: CLINIC | Age: 37
End: 2022-12-06

## 2022-12-06 VITALS
TEMPERATURE: 99 F | HEART RATE: 72 BPM | DIASTOLIC BLOOD PRESSURE: 78 MMHG | SYSTOLIC BLOOD PRESSURE: 116 MMHG | HEIGHT: 68 IN | WEIGHT: 201 LBS | BODY MASS INDEX: 30.46 KG/M2 | OXYGEN SATURATION: 97 %

## 2022-12-06 DIAGNOSIS — R07.9 CHEST PAIN: Primary | ICD-10-CM

## 2022-12-06 DIAGNOSIS — Z82.49 FAMILY HISTORY OF EARLY CAD: ICD-10-CM

## 2022-12-06 DIAGNOSIS — E66.09 CLASS 1 OBESITY DUE TO EXCESS CALORIES WITHOUT SERIOUS COMORBIDITY WITH BODY MASS INDEX (BMI) OF 30.0 TO 30.9 IN ADULT: ICD-10-CM

## 2022-12-06 NOTE — PROGRESS NOTES
[facility] [address] [address]  Phone#  [phone]  Fax#  [phone]                                               Cardiology Office Consult   Sonya Ramachandran (Current Name)  Ara Johansen, 40 y o  female (unable to change the correct name in patient chart)  YOB: 1985  MRN: [id number] Encounter: [id number]      PCP - Vincent Suarez MD  Referring Provider - Elvin Rios MD    Chief Complaint   Patient presents with   • New Patient Visit   • Chest Pain     In the ER about 2 weeks ago for chest pain       Assessment  1  Chest pain  2  Family h/o early CAD  3  Anxiety  · H/o Panic attack  4  Alcohol dependence  · Has 2 drinks of vodka daily  5  Obesity, Body mass index is 30 56 kg/m²  Plan  Chest pain, Family h/o early CAD  · Atypical symptoms, unclear etiology  · ECG without any acute ST-T changes  · Hs-troponins were negative x2 at the time of the episode  · She does have some risk factors including obesity  · We will check an exercise ECG stress test for further re-stratification  · Check echocardiogram to assess for pericardial effusion and LVEF  · Counseled regarding need to reduce alcohol intake & avoid if possible      Results for orders placed or performed in visit on 12/06/22   POCT ECG    Impression    Normal sinus rhythm  Normal ECG       Orders Placed This Encounter   Procedures   • Stress test only, exercise   • POCT ECG   • Echo complete w/ contrast if indicated     Return in about 3 months (around 3/6/2023), or if symptoms worsen or fail to improve  History of Present Illness   40 y o  female, who works as an , comes in as a new patient for consultation regarding symptoms of chest pain and recent ED visit  On 11/15/2022, she originally woke up from sleep around 2 AM with complaints of sudden onset left-sided chest pain  Is located deep underneath her left breast and radiated into the left axilla    It was sharp, stabbing in character, about 6 out of 10 in intensity and associated with some shortness of breath  She initially rested and waited for a while, but by 6 AM the pain was not resolving and as a result she finally went to the ED at [address] [place] [address] for evaluation  She was ruled out for ACS with negative ECG and negative troponins x2 and discharged with outpatient follow-up  She reports continued chest pain for about 2 days subsequently, and then eventually it resolved  No further recurrences of chest pain since then and she continues to feel better  She does have a prior history of panic attacks, but she feels this was very different from the same  She continues to be active walking about 30 minutes on most days without any symptoms  No prior history of CAD or heart failure    Family history  Paternal GM-  at age 36 of heart attack  Father - stroke in early 62s,  Mother - healthy      Historical Information   Past Medical History:   Diagnosis Date   • Anemia     20 years ago   • Concussion    • Migraine    • Urinary tract infection     In the past    • Varicella      Past Surgical History:   Procedure Laterality Date   • FINGER SURGERY Left    • [place] N/A 2021    Procedure:  SECTION ();   Surgeon: Katie Castillo MD;  Location: [facility] ;  Service: Obstetrics   • WISDOM TOOTH EXTRACTION Bilateral      Family History   Problem Relation Age of Onset   • Hyperlipidemia Mother    • Transient ischemic attack Father    • Diabetes type II Father    • Stroke Father         mini stroke   • Dementia Maternal Grandmother    • Heart attack Paternal Grandmother    • No Known Problems Sister      Current Outpatient Medications on File Prior to Visit   Medication Sig Dispense Refill   • albuterol (PROVENTIL HFA,VENTOLIN HFA) 90 mcg/act inhaler INHALE 2 PUFFS EVERY 6 HOURS AS NEEDED FOR WHEEZING OR SHORTNESS OF BREATH FOR UP TO 30 DAYS  (Patient not taking: Reported on 10/24/2022)     • Etonogestrel (NEXPLANON SC) Inject under the skin     • famotidine (PEPCID) 20 mg tablet Take 1 tablet (20 mg total) by mouth 2 (two) times a day for 2 days (Patient not taking: Reported on 6/14/2022) 4 tablet 0   • fluticasone (FLONASE) 50 mcg/act nasal spray 1 spray into each nostril daily for 10 days 18 mL 3   • ondansetron (ZOFRAN) 4 mg tablet Take 1 tablet (4 mg total) by mouth every 6 (six) hours for 2 days (Patient not taking: Reported on 6/14/2022) 8 tablet 0   • sertraline (ZOLOFT) 50 mg tablet TAKE 1 AND 1/2 TABLETS BY MOUTH DAILY 135 tablet 1   • sucralfate (CARAFATE) 1 g tablet Take 1 tablet (1 g total) by mouth 4 (four) times a day for 2 days (Patient not taking: Reported on 6/14/2022) 8 tablet 0     No current facility-administered medications on file prior to visit  Allergies   Allergen Reactions   • Penicillins      Social History     Socioeconomic History   • Marital status: /Civil Union     Spouse name: None   • Number of children: None   • Years of education: None   • Highest education level: None   Occupational History   • None   Tobacco Use   • Smoking status: Never   • Smokeless tobacco: Never   Vaping Use   • Vaping Use: Never used   Substance and Sexual Activity   • Alcohol use: Yes   • Drug use: Never   • Sexual activity: Yes     Partners: Male     Birth control/protection: Implant   Other Topics Concern   • None   Social History Narrative   • None     Social Determinants of Health     Financial Resource Strain: Not on file   Food Insecurity: Not on file   Transportation Needs: Not on file   Physical Activity: Not on file   Stress: Not on file   Social Connections: Not on file   Intimate Partner Violence: Not on file   Housing Stability: Not on file        Review of Systems   All other systems reviewed and are negative          Vitals:  Vitals:    12/06/22 1349   BP: 116/78   BP Location: Right arm   Patient Position: Sitting   Cuff Size: Adult   Pulse: 72 Temp: 99 °F (37 2 °C)   TempSrc: Tympanic   SpO2: 97%   Weight: 91 2 kg (201 lb)   Height: 5' 8" (1 727 m)     BMI - Body mass index is 30 56 kg/m²  Wt Readings from Last 7 Encounters:   12/06/22 91 2 kg (201 lb)   11/15/22 91 6 kg (201 lb 15 1 oz)   10/24/22 91 3 kg (201 lb 3 2 oz)   09/21/22 88 9 kg (196 lb)   05/05/22 90 3 kg (199 lb)   03/09/22 91 kg (200 lb 9 6 oz)   12/13/21 92 kg (202 lb 12 8 oz)       Physical Exam  Vitals and nursing note reviewed  Constitutional:       General: She is not in acute distress  Appearance: Normal appearance  She is well-developed  She is obese  She is not ill-appearing  HENT:      Head: Normocephalic and atraumatic  Nose: No congestion  Eyes:      General: No scleral icterus  Conjunctiva/sclera: Conjunctivae normal    Neck:      Vascular: No carotid bruit or JVD  Cardiovascular:      Rate and Rhythm: Normal rate and regular rhythm  Pulses: Normal pulses  Heart sounds: Normal heart sounds  No murmur heard  No friction rub  No gallop  Pulmonary:      Effort: Pulmonary effort is normal  No respiratory distress  Breath sounds: Normal breath sounds  No rales  Abdominal:      General: There is no distension  Palpations: Abdomen is soft  Tenderness: There is no abdominal tenderness  Musculoskeletal:         General: No swelling or tenderness  Cervical back: Neck supple  Right lower leg: No edema  Left lower leg: No edema  Skin:     General: Skin is warm  Neurological:      General: No focal deficit present  Mental Status: She is alert and oriented to person, place, and time  Mental status is at baseline  Psychiatric:         Mood and Affect: Mood normal          Behavior: Behavior normal          Thought Content:  Thought content normal            Labs:  CBC:   Lab Results   Component Value Date    WBC 6 42 11/15/2022    RBC 4 60 11/15/2022    HGB 13 6 11/15/2022    HCT 40 7 11/15/2022    MCV 89 11/15/2022     11/15/2022    RDW 14 1 11/15/2022       CMP:   Lab Results   Component Value Date    K 4 3 11/15/2022     (H) 11/15/2022    CO2 22 11/15/2022    BUN 13 11/15/2022    CREATININE 0 86 11/15/2022    EGFR 87 11/15/2022    CALCIUM 9 2 11/15/2022    AST 18 11/15/2022    ALT 19 11/15/2022    ALKPHOS 48 11/15/2022       Magnesium:  No results found for: MG    Lipid Profile:   No results found for: CHOL, HDL, TRIG, LDLCALC    Thyroid Studies: No results found for: WGS1ENBKASSM, T3FREE, FREET4, L1OXDPD, V4APGCH    A1c:  No components found for: HGA1C    INR:  Lab Results   Component Value Date    INR 0 92 02/17/2022   5    Imaging: XR chest 1 view portable    Result Date: 11/15/2022  Narrative: CHEST INDICATION:   cp/sob  COMPARISON:  Abdomen CT 2/18/2022, CXR 2/17/2022  EXAM PERFORMED/VIEWS:  XR CHEST PORTABLE FINDINGS: Cardiomediastinal silhouette appears unremarkable  The lungs are clear  No pneumothorax or pleural effusion  Osseous structures appear within normal limits for patient age  Impression: No acute cardiopulmonary disease  Workstation performed: [id number]       Cardiac testing:   No results found for this or any previous visit  No results found for this or any previous visit  No results found for this or any previous visit  No results found for this or any previous visit  XR chest 1 view portable  Narrative: CHEST     INDICATION:   cp/sob  COMPARISON:  Abdomen CT 2/18/2022, CXR 2/17/2022  EXAM PERFORMED/VIEWS:  XR CHEST PORTABLE    FINDINGS:    Cardiomediastinal silhouette appears unremarkable  The lungs are clear  No pneumothorax or pleural effusion  Osseous structures appear within normal limits for patient age  Impression: No acute cardiopulmonary disease      Workstation performed: [id number]
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
## 2022-12-15 DIAGNOSIS — F41.9 ANXIETY: ICD-10-CM

## 2022-12-19 ENCOUNTER — OFFICE VISIT (OUTPATIENT)
Dept: PAIN MEDICINE | Facility: CLINIC | Age: 37
End: 2022-12-19

## 2022-12-19 VITALS
DIASTOLIC BLOOD PRESSURE: 86 MMHG | HEIGHT: 68 IN | HEART RATE: 85 BPM | SYSTOLIC BLOOD PRESSURE: 134 MMHG | BODY MASS INDEX: 30.46 KG/M2 | WEIGHT: 201 LBS

## 2022-12-19 DIAGNOSIS — R51.9 CHRONIC NONINTRACTABLE HEADACHE, UNSPECIFIED HEADACHE TYPE: Primary | ICD-10-CM

## 2022-12-19 DIAGNOSIS — M54.2 NECK PAIN: ICD-10-CM

## 2022-12-19 DIAGNOSIS — G89.29 CHRONIC NONINTRACTABLE HEADACHE, UNSPECIFIED HEADACHE TYPE: Primary | ICD-10-CM

## 2022-12-19 DIAGNOSIS — M47.812 CERVICAL SPONDYLOSIS: ICD-10-CM

## 2022-12-19 RX ORDER — PREGABALIN 25 MG/1
25 CAPSULE ORAL 2 TIMES DAILY
Qty: 60 CAPSULE | Refills: 1 | Status: SHIPPED | OUTPATIENT
Start: 2022-12-19

## 2023-03-08 ENCOUNTER — TELEPHONE (OUTPATIENT)
Dept: NEUROLOGY | Facility: CLINIC | Age: 38
End: 2023-03-08

## 2023-03-14 ENCOUNTER — CONSULT (OUTPATIENT)
Dept: NEUROLOGY | Facility: CLINIC | Age: 38
End: 2023-03-14

## 2023-03-14 VITALS
SYSTOLIC BLOOD PRESSURE: 110 MMHG | HEART RATE: 77 BPM | HEIGHT: 68 IN | WEIGHT: 197 LBS | BODY MASS INDEX: 29.86 KG/M2 | DIASTOLIC BLOOD PRESSURE: 70 MMHG

## 2023-03-14 DIAGNOSIS — M54.81 BILATERAL OCCIPITAL NEURALGIA: ICD-10-CM

## 2023-03-14 DIAGNOSIS — G43.009 MIGRAINE WITHOUT AURA AND WITHOUT STATUS MIGRAINOSUS, NOT INTRACTABLE: ICD-10-CM

## 2023-03-14 DIAGNOSIS — G47.33 OSA (OBSTRUCTIVE SLEEP APNEA): ICD-10-CM

## 2023-03-14 DIAGNOSIS — G89.29 CHRONIC HEADACHE: Primary | ICD-10-CM

## 2023-03-14 DIAGNOSIS — R51.9 CHRONIC HEADACHE: Primary | ICD-10-CM

## 2023-03-14 RX ORDER — NORTRIPTYLINE HYDROCHLORIDE 10 MG/1
10 CAPSULE ORAL
Qty: 30 CAPSULE | Refills: 4 | Status: SHIPPED | OUTPATIENT
Start: 2023-03-14

## 2023-03-14 RX ORDER — CLARITHROMYCIN 250 MG/1
TABLET, FILM COATED ORAL
COMMUNITY
Start: 2023-03-06

## 2023-03-14 RX ORDER — SUMATRIPTAN 50 MG/1
50 TABLET, FILM COATED ORAL ONCE AS NEEDED
Qty: 9 TABLET | Refills: 1 | Status: SHIPPED | OUTPATIENT
Start: 2023-03-14
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
## 2023-03-14 NOTE — PATIENT INSTRUCTIONS
Referral to Sleep medicine to evaluate for sleep apnea    MRI brain with and without contrast    Nortriptyline 10mg daily at bedtime  After a couple of weeks, may increase it to 20mg daily    Sumatriptan 50mg by mouth as needed  Please start taking one tablet as soon as the headache start  Can take 2nd tablet in 2 hours if there is no improvement   Do not take more than 2 tablets in 24hr    Follow up with pain medicine for possible occipital nerve block if necessary    Recommend  reduction in alcohol use    Follow up in 3-4 months

## 2023-05-23 ENCOUNTER — AMB VIDEO VISIT (OUTPATIENT)
Dept: OTHER | Facility: HOSPITAL | Age: 38
End: 2023-05-23

## 2023-05-23 NOTE — CARE ANYWHERE EVISITS
Visit Summary for DIPTI MANRIQUEZ - Gender: Female - Date of Birth: [id number]  Date: [id number] - Duration: 4 minutes  Patient: [facility]  Provider: Wilfrid Soliz    Patient Contact Information  Address  [place] [address]  [phone]    Visit Topics  Eye is swollen/red itchy and watery [Added By: Self - 2023-05-23]    Triage Questions   What is your current physical address in the event of a medical emergency? Answer []  Are you allergic to any medications? Answer []  Are you now or could you be pregnant? Answer []  Do you have any immune system compromise or chronic lung   disease? Answer []  Do you have any vulnerable family members in the home (infant, pregnant, cancer, elderly)? Answer []     Conversation Transcripts  [0A][0A] [Notification] Ghanshyam Kramer, Global Staff, will help you prepare for your visit  He is assisting Wilfrid Soliz, Family [de-identified] [address], and thank you for connecting  While you are waiting for the doctor, are there any   questions I can answer for you about our service? Please contact customer service if you have questions about billing, insurance, or technical issues  Visits work best with a stable WiFi connection, so please make sure you are connected before we   begin [0A][Notification] Ghanshyam Kramer has left the room  [0A][Notification] You are connected with [facility] [0A][Notification] Olga Lidia Saavedra is located in [place]  [0A][Notification] Olga Lidia Saavedra has shared health history  Enrrique Blair  [0A]    Diagnosis  Unspecified acute conjunctivitis, right eye    Procedures  Value: [id number] Code: CPT-4 UNLISTED E&M SERVICE    Medications Prescribed    polymyxin B sulf-trimethoprim  Dose : 1 drop  Route : ophthalmic (eye)  Frequency : every 6 hours  Until directed to stop     Patient Instructions : into right eye for 5-7 days  Refills : 0  Instructions to the Pharmacist : Substitutions allowed      Provider Notes  [0A][0A] Mode of Communication: Video visit  Patients information verified  [0A]History: Patient is a 41 yo F presenting for right eye lid swelling, eye redness, eye tearing, eye discharge, subjective fever, eye itching  She denies eye trauma, contact   lens use, recent swimming, exposure to pink eye  Symptoms started yesterday  She also admits to a sore throat, nasal congestion  She has tried Benadryl  [0A]Past medical history:Reviewed [0A]Medications:Reviewed [0A]Allergies:Penicillin [0A]PE:    [0A][0A]Vitals signs  (if available):  [0A][0A]Weight:   [0A][0A]Gen: Appears comfortable, in no acute distress[0A]Eyes:      Extraocular movements are intact, no proptosis  Right conjunctiva redness, eye lid swelling, no eye discharge seen  Left eye   appears normal[0A]Nose:   [0A][0A]Sinuses:[0A][0A]Other:   [0A]Assessment:  Conjunctivitis, right [0A]Diagnosis code:[0A]Plan:Below eye drop sent to pharmacy  Below home care encouraged  Counseled on warning signs, advised to follow up if present  Patient was in agreement with plan, and verbalized understanding  1    Medications:                 a   Trimethoprim-polymyxin B ophthalmic drops 1 to 2 drops four times daily for 5 to 7 days                 b   Other medication:        2  Specific Referral or follow up:        3  Home care:Warm compressors to eye lid for swelling                 a   Wash hands after touching eye                 b   Avoid contact lenses until symptoms resolve                 c   May use saline eye drops   as needed for comfort                 d     Discard or sterilize lens cases and eye makeup  4  Return to work/school: May return after > 24 hours of treatment and symptoms improving        5     Medical decision making:[0A]Additional recommendations:   [0A]    If you received a prescription at this visit and you have a question or problem please call [phone] for prescription assistance [0A]    Please print a copy of this note and send it to your regular doctor, or take it to your next visit so it   may be included in your medical record [0A]    Please see your primary care provider on an annual basis or more frequently if directed [0A]The patient voiced understanding and agreement with plan  [0A]    Electronically signed by: Amna Che(NPI [id number])

## 2023-05-25 ENCOUNTER — APPOINTMENT (EMERGENCY)
Dept: RADIOLOGY | Facility: HOSPITAL | Age: 38
End: 2023-05-25

## 2023-05-25 ENCOUNTER — HOSPITAL ENCOUNTER (EMERGENCY)
Facility: HOSPITAL | Age: 38
Discharge: HOME | End: 2023-05-25
Attending: EMERGENCY MEDICINE

## 2023-05-25 ENCOUNTER — APPOINTMENT (EMERGENCY)
Dept: ULTRASOUND IMAGING | Facility: HOSPITAL | Age: 38
End: 2023-05-25

## 2023-05-25 VITALS
SYSTOLIC BLOOD PRESSURE: 116 MMHG | DIASTOLIC BLOOD PRESSURE: 79 MMHG | BODY MASS INDEX: 29.86 KG/M2 | WEIGHT: 197 LBS | HEART RATE: 72 BPM | TEMPERATURE: 98.1 F | OXYGEN SATURATION: 95 % | HEIGHT: 68 IN | RESPIRATION RATE: 16 BRPM

## 2023-05-25 DIAGNOSIS — J06.9 VIRAL UPPER RESPIRATORY TRACT INFECTION: Primary | ICD-10-CM

## 2023-05-25 LAB
ALBUMIN SERPL BCP-MCNC: 4.5 G/DL (ref 3.5–5)
ALP SERPL-CCNC: 49 U/L (ref 34–104)
ALT SERPL W P-5'-P-CCNC: 19 U/L (ref 7–52)
ANION GAP SERPL CALCULATED.3IONS-SCNC: 7 MMOL/L (ref 4–13)
AST SERPL W P-5'-P-CCNC: 16 U/L (ref 13–39)
BASOPHILS # BLD AUTO: 0.02 THOUSANDS/ÂΜL (ref 0–0.1)
BASOPHILS NFR BLD AUTO: 0 % (ref 0–1)
BILIRUB SERPL-MCNC: 0.47 MG/DL (ref 0.2–1)
BILIRUB UR QL STRIP: NEGATIVE
BUN SERPL-MCNC: 15 MG/DL (ref 5–25)
CALCIUM SERPL-MCNC: 9.8 MG/DL (ref 8.4–10.2)
CHLORIDE SERPL-SCNC: 103 MMOL/L (ref 96–108)
CLARITY UR: CLEAR
CO2 SERPL-SCNC: 28 MMOL/L (ref 21–32)
COLOR UR: NORMAL
CREAT SERPL-MCNC: 0.84 MG/DL (ref 0.6–1.3)
EOSINOPHIL # BLD AUTO: 0 THOUSAND/ÂΜL (ref 0–0.61)
EOSINOPHIL NFR BLD AUTO: 0 % (ref 0–6)
ERYTHROCYTE [DISTWIDTH] IN BLOOD BY AUTOMATED COUNT: 14.2 % (ref 11.6–15.1)
EXT PREGNANCY TEST URINE: NEGATIVE
EXT. CONTROL: NORMAL
GFR SERPL CREATININE-BSD FRML MDRD: 89 ML/MIN/1.73SQ M
GLUCOSE SERPL-MCNC: 77 MG/DL (ref 65–140)
GLUCOSE UR STRIP-MCNC: NEGATIVE MG/DL
HCT VFR BLD AUTO: 43.6 % (ref 34.8–46.1)
HGB BLD-MCNC: 14.3 G/DL (ref 11.5–15.4)
HGB UR QL STRIP.AUTO: NEGATIVE
IMM GRANULOCYTES # BLD AUTO: 0.04 THOUSAND/UL (ref 0–0.2)
IMM GRANULOCYTES NFR BLD AUTO: 1 % (ref 0–2)
KETONES UR STRIP-MCNC: NEGATIVE MG/DL
LEUKOCYTE ESTERASE UR QL STRIP: NEGATIVE
LIPASE SERPL-CCNC: 39 U/L (ref 11–82)
LYMPHOCYTES # BLD AUTO: 1.44 THOUSANDS/ÂΜL (ref 0.6–4.47)
LYMPHOCYTES NFR BLD AUTO: 18 % (ref 14–44)
MCH RBC QN AUTO: 30.6 PG (ref 26.8–34.3)
MCHC RBC AUTO-ENTMCNC: 32.8 G/DL (ref 31.4–37.4)
MCV RBC AUTO: 93 FL (ref 82–98)
MONOCYTES # BLD AUTO: 0.66 THOUSAND/ÂΜL (ref 0.17–1.22)
MONOCYTES NFR BLD AUTO: 8 % (ref 4–12)
NEUTROPHILS # BLD AUTO: 5.9 THOUSANDS/ÂΜL (ref 1.85–7.62)
NEUTS SEG NFR BLD AUTO: 73 % (ref 43–75)
NITRITE UR QL STRIP: NEGATIVE
NRBC BLD AUTO-RTO: 0 /100 WBCS
PH UR STRIP.AUTO: 5.5 [PH]
PLATELET # BLD AUTO: 219 THOUSANDS/UL (ref 149–390)
PMV BLD AUTO: 10.7 FL (ref 8.9–12.7)
POTASSIUM SERPL-SCNC: 3.8 MMOL/L (ref 3.5–5.3)
PROT SERPL-MCNC: 8.6 G/DL (ref 6.4–8.4)
PROT UR STRIP-MCNC: NEGATIVE MG/DL
RBC # BLD AUTO: 4.68 MILLION/UL (ref 3.81–5.12)
SODIUM SERPL-SCNC: 138 MMOL/L (ref 135–147)
SP GR UR STRIP.AUTO: 1.02 (ref 1–1.03)
UROBILINOGEN UR STRIP-ACNC: <2 MG/DL
WBC # BLD AUTO: 8.06 THOUSAND/UL (ref 4.31–10.16)

## 2023-05-25 RX ORDER — IBUPROFEN 600 MG/1
600 TABLET ORAL ONCE
Status: COMPLETED | OUTPATIENT
Start: 2023-05-25 | End: 2023-05-25

## 2023-05-25 RX ORDER — OXYCODONE HYDROCHLORIDE 5 MG/1
5 CAPSULE ORAL EVERY 6 HOURS PRN
Qty: 5 CAPSULE | Refills: 0 | Status: SHIPPED | OUTPATIENT
Start: 2023-05-25

## 2023-05-25 RX ORDER — OXYCODONE HYDROCHLORIDE 5 MG/1
5 TABLET ORAL ONCE
Status: COMPLETED | OUTPATIENT
Start: 2023-05-25 | End: 2023-05-25

## 2023-05-25 RX ADMIN — SODIUM CHLORIDE, SODIUM LACTATE, POTASSIUM CHLORIDE, AND CALCIUM CHLORIDE 1000 ML: .6; .31; .03; .02 INJECTION, SOLUTION INTRAVENOUS at 10:52

## 2023-05-25 RX ADMIN — IBUPROFEN 600 MG: 600 TABLET, FILM COATED ORAL at 09:54

## 2023-05-25 RX ADMIN — OXYCODONE HYDROCHLORIDE 5 MG: 5 TABLET ORAL at 12:07
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
## 2023-05-30 NOTE — ED ATTENDING ATTESTATION
5/25/2023  I, Aries Carter MD, saw and evaluated the patient  I have discussed the patient with the resident/non-physician practitioner and agree with the resident's/non-physician practitioner's findings, Plan of Care, and MDM as documented in the resident's/non-physician practitioner's note, except where noted  All available labs and Radiology studies were reviewed  I was present for key portions of any procedure(s) performed by the resident/non-physician practitioner and I was immediately available to provide assistance  At this point I agree with the current assessment done in the Emergency Department    I have conducted an independent evaluation of this patient a history and physical is as follows:see h and p above     ED Course  ED Course as of 05/30/23 1715   Thu May 25, 2023   1127 Cxr portable- compared to previous 11/22- no sign changes- no change in mediastinum/cardiac silhouette- no free/sq air- no infiltrate- ptx- pulm edema- pleural effusions         Critical Care Time  Procedures

## 2023-06-21 DIAGNOSIS — F41.9 ANXIETY: ICD-10-CM

## 2023-06-21 NOTE — TELEPHONE ENCOUNTER
Called pt and lvm -- asked to call back to set up establish care appt w new provider so pt can continue to receive Rx refills

## 2023-07-05 DIAGNOSIS — F41.9 ANXIETY: ICD-10-CM

## 2023-07-05 NOTE — TELEPHONE ENCOUNTER
I left a message for the patient to call  To set up an appointment to establish care with a provider

## 2023-09-14 ENCOUNTER — APPOINTMENT (EMERGENCY)
Dept: RADIOLOGY | Facility: HOSPITAL | Age: 38
End: 2023-09-14
Payer: COMMERCIAL

## 2023-09-14 ENCOUNTER — TELEPHONE (OUTPATIENT)
Age: 38
End: 2023-09-14

## 2023-09-14 ENCOUNTER — HOSPITAL ENCOUNTER (EMERGENCY)
Facility: HOSPITAL | Age: 38
Discharge: HOME | End: 2023-09-14
Attending: EMERGENCY MEDICINE
Payer: COMMERCIAL

## 2023-09-14 VITALS
TEMPERATURE: 98.8 F | OXYGEN SATURATION: 96 % | BODY MASS INDEX: 28.79 KG/M2 | HEIGHT: 68 IN | WEIGHT: 190 LBS | HEART RATE: 87 BPM | RESPIRATION RATE: 20 BRPM | SYSTOLIC BLOOD PRESSURE: 128 MMHG | DIASTOLIC BLOOD PRESSURE: 75 MMHG

## 2023-09-14 DIAGNOSIS — M54.9 BACK PAIN: Primary | ICD-10-CM

## 2023-09-14 DIAGNOSIS — M54.50 ACUTE BILATERAL LOW BACK PAIN WITHOUT SCIATICA: Primary | ICD-10-CM

## 2023-09-14 DIAGNOSIS — G43.909 MIGRAINE: ICD-10-CM

## 2023-09-14 DIAGNOSIS — M54.2 NECK PAIN: ICD-10-CM

## 2023-09-14 PROCEDURE — 96375 TX/PRO/DX INJ NEW DRUG ADDON: CPT

## 2023-09-14 PROCEDURE — 96365 THER/PROPH/DIAG IV INF INIT: CPT

## 2023-09-14 PROCEDURE — 99285 EMERGENCY DEPT VISIT HI MDM: CPT

## 2023-09-14 PROCEDURE — 99283 EMERGENCY DEPT VISIT LOW MDM: CPT

## 2023-09-14 PROCEDURE — 72100 X-RAY EXAM L-S SPINE 2/3 VWS: CPT

## 2023-09-14 RX ORDER — METHYLPREDNISOLONE 4 MG/1
TABLET ORAL
Qty: 21 TABLET | Refills: 0 | Status: SHIPPED | OUTPATIENT
Start: 2023-09-14

## 2023-09-14 RX ORDER — ACETAMINOPHEN 325 MG/1
650 TABLET ORAL ONCE
Status: DISCONTINUED | OUTPATIENT
Start: 2023-09-14 | End: 2023-09-14 | Stop reason: HOSPADM

## 2023-09-14 RX ORDER — METOCLOPRAMIDE HYDROCHLORIDE 5 MG/ML
10 INJECTION INTRAMUSCULAR; INTRAVENOUS ONCE
Status: COMPLETED | OUTPATIENT
Start: 2023-09-14 | End: 2023-09-14

## 2023-09-14 RX ORDER — MELOXICAM 15 MG/1
15 TABLET ORAL DAILY
Qty: 30 TABLET | Refills: 0 | Status: SHIPPED | OUTPATIENT
Start: 2023-09-14 | End: 2023-10-14

## 2023-09-14 RX ORDER — KETOROLAC TROMETHAMINE 30 MG/ML
15 INJECTION, SOLUTION INTRAMUSCULAR; INTRAVENOUS ONCE
Status: COMPLETED | OUTPATIENT
Start: 2023-09-14 | End: 2023-09-14

## 2023-09-14 RX ORDER — PREDNISONE 20 MG/1
40 TABLET ORAL ONCE
Status: COMPLETED | OUTPATIENT
Start: 2023-09-14 | End: 2023-09-14

## 2023-09-14 RX ORDER — MAGNESIUM SULFATE HEPTAHYDRATE 40 MG/ML
2 INJECTION, SOLUTION INTRAVENOUS ONCE
Status: COMPLETED | OUTPATIENT
Start: 2023-09-14 | End: 2023-09-14

## 2023-09-14 RX ORDER — PREDNISONE 20 MG/1
20 TABLET ORAL 2 TIMES DAILY WITH MEALS
Qty: 10 TABLET | Refills: 0 | Status: SHIPPED | OUTPATIENT
Start: 2023-09-14 | End: 2023-09-19

## 2023-09-14 RX ORDER — METHOCARBAMOL 500 MG/1
500 TABLET, FILM COATED ORAL ONCE
Status: COMPLETED | OUTPATIENT
Start: 2023-09-14 | End: 2023-09-14

## 2023-09-14 RX ADMIN — METOCLOPRAMIDE 10 MG: 5 INJECTION, SOLUTION INTRAMUSCULAR; INTRAVENOUS at 13:39

## 2023-09-14 RX ADMIN — MAGNESIUM SULFATE HEPTAHYDRATE 2 G: 40 INJECTION, SOLUTION INTRAVENOUS at 13:41

## 2023-09-14 RX ADMIN — PREDNISONE 40 MG: 20 TABLET ORAL at 12:44

## 2023-09-14 RX ADMIN — KETOROLAC TROMETHAMINE 15 MG: 30 INJECTION, SOLUTION INTRAMUSCULAR at 13:39

## 2023-09-14 RX ADMIN — SODIUM CHLORIDE 1000 ML: 0.9 INJECTION, SOLUTION INTRAVENOUS at 13:40

## 2023-09-14 RX ADMIN — METHOCARBAMOL 500 MG: 500 TABLET ORAL at 12:44

## 2023-09-14 NOTE — TELEPHONE ENCOUNTER
Patient   Dr. Alexandru Shaffer  [phone]    Pt is calling in with a NI of back pain. Since Enriqueta Cole is leaving she will be scheduled with Dr. Hedy Ledezma. However pt is asking what can she do in the meantime until she is seen? Can  assist with that for now at least? Please follow up with pt.      [id number]- [place], [place] - [address]
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
## 2023-09-14 NOTE — ED PROVIDER NOTES
History  Chief Complaint   Patient presents with   • Back Pain     Pt reports her toddler jumped on her lower back Tuesday. Pt reports sciatic pain going down left leg to ankle. Also c/o headache and pain from tailbone to neck. Pt had covid 2 weeks ago     41year old female presenting today with concerns of lower back injury after her toddler, 30 pounds, jumped onto her back. Patient does have a history of sciatica and she thinks that made it much worse. Also complains of a migraine headache, similar to previous. Denies any fever, chills, chest pain, shortness of breath, nausea, vomiting, photophobia, history of IV drug use, saddle anesthesia, fecal or urinary incontinence. Prior to Admission Medications   Prescriptions Last Dose Informant Patient Reported? Taking? Etonogestrel (NEXPLANON SC)  Self Yes No   Sig: Inject under the skin   SUMAtriptan (Imitrex) 50 mg tablet   No No   Sig: Take 1 tablet (50 mg total) by mouth once as needed for migraine If no improvement in 2 hours, you may take one more tablet. Please do not take more than 2 tablets in 24 hours   albuterol (PROVENTIL HFA,VENTOLIN HFA) 90 mcg/act inhaler  Self Yes No   Sig: INHALE 2 PUFFS EVERY 6 HOURS AS NEEDED FOR WHEEZING OR SHORTNESS OF BREATH FOR UP TO 30 DAYS.    Patient not taking: Reported on 10/24/2022   clarithromycin (BIAXIN) 250 mg tablet   Yes No   famotidine (PEPCID) 20 mg tablet   No No   Sig: Take 1 tablet (20 mg total) by mouth 2 (two) times a day for 2 days   fluticasone (FLONASE) 50 mcg/act nasal spray   No No   Si spray into each nostril daily for 10 days   meloxicam (Mobic) 15 mg tablet   No No   Sig: Take 1 tablet (15 mg total) by mouth daily   methylPREDNISolone 4 MG tablet therapy pack   No No   Sig: Use as directed on package   nortriptyline (PAMELOR) 10 mg capsule   No No   Sig: Take 1 capsule (10 mg total) by mouth daily at bedtime   ondansetron (ZOFRAN) 4 mg tablet   No No   Sig: Take 1 tablet (4 mg total) by mouth every 6 (six) hours for 2 days   oxyCODONE (OXY-IR) 5 MG capsule   No No   Sig: Take 1 capsule (5 mg total) by mouth every 6 (six) hours as needed for severe pain for up to 5 doses Can substitute oxycodone tablets for capsules as needed for severe pain Max Daily Amount: 20 mg   pregabalin (LYRICA) 25 mg capsule   No No   Sig: Take 1 capsule (25 mg total) by mouth 2 (two) times a day   Patient not taking: Reported on 3/14/2023   sertraline (ZOLOFT) 50 mg tablet   No No   Sig: TAKE 1.5 TABLETS BY MOUTH EVERY DAY   sucralfate (CARAFATE) 1 g tablet   No No   Sig: Take 1 tablet (1 g total) by mouth 4 (four) times a day for 2 days   Patient not taking: Reported on 2022      Facility-Administered Medications: None       Past Medical History:   Diagnosis Date   • Anemia     20 years ago   • Concussion    • Migraine    • Urinary tract infection     In the past    • Varicella        Past Surgical History:   Procedure Laterality Date   • FINGER SURGERY Left    • [place]  DELIVERY ONLY N/A 2021    Procedure:  SECTION (); Surgeon: Sammy Dumont MD;  Location: [facility] ;  Service: Obstetrics   • WISDOM TOOTH EXTRACTION Bilateral        Family History   Problem Relation Age of Onset   • Hyperlipidemia Mother    • Transient ischemic attack Father    • Diabetes type II Father    • Stroke Father         mini stroke   • Dementia Maternal Grandmother    • Heart attack Paternal Grandmother    • No Known Problems Sister      I have reviewed and agree with the history as documented.     E-Cigarette/Vaping   • E-Cigarette Use Never User      E-Cigarette/Vaping Substances   • Nicotine No    • THC No    • CBD No    • Flavoring No    • Other No    • Unknown No      Social History     Tobacco Use   • Smoking status: Never   • Smokeless tobacco: Never   Vaping Use   • Vaping Use: Never used   Substance Use Topics   • Alcohol use: Yes     Comment: social   • Drug use: Never       Review of Systems Constitutional: Negative for chills and fever. HENT: Negative for ear pain and sore throat. Eyes: Negative for pain and visual disturbance. Respiratory: Negative for apnea, cough, choking, chest tightness, shortness of breath, wheezing and stridor. Cardiovascular: Negative for chest pain and palpitations. Gastrointestinal: Negative for abdominal pain, blood in stool, constipation, diarrhea, nausea and vomiting. Genitourinary: Negative for dysuria, flank pain, frequency and hematuria. Musculoskeletal: Positive for back pain. Negative for arthralgias. Skin: Negative for color change and rash. Neurological: Positive for headaches. Negative for dizziness, seizures, syncope, weakness and light-headedness. All other systems reviewed and are negative. Physical Exam  Physical Exam  Vitals and nursing note reviewed. Constitutional:       General: She is not in acute distress. Appearance: She is well-developed. She is ill-appearing. HENT:      Head: Normocephalic and atraumatic. Eyes:      Conjunctiva/sclera: Conjunctivae normal.   Cardiovascular:      Rate and Rhythm: Normal rate and regular rhythm. Heart sounds: No murmur heard. Pulmonary:      Effort: Pulmonary effort is normal. No respiratory distress. Breath sounds: Normal breath sounds. No stridor. No wheezing, rhonchi or rales. Chest:      Chest wall: No tenderness. Abdominal:      Palpations: Abdomen is soft. Tenderness: There is no abdominal tenderness. There is no right CVA tenderness or left CVA tenderness. Musculoskeletal:         General: No swelling, tenderness, deformity or signs of injury. Cervical back: Neck supple. Right lower leg: No edema. Left lower leg: No edema. Skin:     General: Skin is warm and dry. Capillary Refill: Capillary refill takes less than 2 seconds. Coloration: Skin is not jaundiced or pale. Findings: No bruising, erythema or lesion. Neurological:      Mental Status: She is alert. Psychiatric:         Mood and Affect: Mood normal.         Vital Signs  ED Triage Vitals [09/14/23 1226]   Temperature Pulse Respirations Blood Pressure SpO2   98.8 °F (37.1 °C) 87 20 128/75 96 %      Temp Source Heart Rate Source Patient Position - Orthostatic VS BP Location FiO2 (%)   Oral Monitor Sitting Right arm --      Pain Score       7           Vitals:    09/14/23 1226   BP: 128/75   Pulse: 87   Patient Position - Orthostatic VS: Sitting         Visual Acuity      ED Medications  Medications   predniSONE tablet 40 mg (40 mg Oral Given 9/14/23 1244)   methocarbamol (ROBAXIN) tablet 500 mg (500 mg Oral Given 9/14/23 1244)   magnesium sulfate 2 g/50 mL IVPB (premix) 2 g (0 g Intravenous Stopped 9/14/23 1458)   metoclopramide (REGLAN) injection 10 mg (10 mg Intravenous Given 9/14/23 1339)   ketorolac (TORADOL) injection 15 mg (15 mg Intravenous Given 9/14/23 1339)   sodium chloride 0.9 % bolus 1,000 mL (0 mL Intravenous Stopped 9/14/23 1458)       Diagnostic Studies  Results Reviewed     None                 XR spine lumbar 2 or 3 views injury   ED Interpretation by Raf Carpenter PA-C (09/14 1307)   No acute fractures or dislocations. Final Result by Isrrael Rodriguez MD (09/14 1424)      Normal examination. Workstation performed: [id number]                    Procedures  Procedures         ED Course                               SBIRT 22yo+    Flowsheet Row Most Recent Value   Initial Alcohol Screen: US AUDIT-C     1. How often do you have a drink containing alcohol? 0 Filed at: 09/14/2023 1310   2. How many drinks containing alcohol do you have on a typical day you are drinking? 0 Filed at: 09/14/2023 1310   3b. FEMALE Any Age, or MALE 65+: How often do you have 4 or more drinks on one occassion? 0 Filed at: 09/14/2023 1310   Audit-C Score 0 Filed at: 09/14/2023 1310   KIRSTEN: How many times in the past year have you. ..     Used an illegal drug or used a prescription medication for non-medical reasons? Never Filed at: 09/14/2023 1310                    Medical Decision Making  41year old female presenting today with concerns of lower back pain, headache, worsening left sciatica. History of the same however worsening. Has tried over-the-counter medications without relief. States that her toddler jumped on her back 2 days ago. Symptoms have not been remitting. We will x-ray lower lumbar spine to rule out fracture. Oral medications for pain as well. Patient agreeable to this plan. XR did not show any acute fractures or dislocations. Oral medications did not alleviate pain. Will give migraine cocktail. Patient feeling better after migraine cocktail. Plan to discharge with prednisone burst. Patient agreeable to plan and follow up with family doctor for further evaluation. Patient at time of discharge well-appearing in no acute distress, questions answered. Patient's vitals, lab/imaging results, diagnosis, and treatment plan were discussed with the patient. All new/changed medications were discussed with patient, specifically, route of administration, how often and when to take, and where they can be picked up. Strict return precautions as well as close follow up with PCP was discussed with the patient and the patient was agreeable to my recommendations. Patient verbally acknowledged understanding of the above communications. All labs reviewed and utilized in the medical decision making process (if labs were ordered). Portions of the record may have been created with voice recognition software.  Occasional wrong word or "sound a like" substitutions may have occurred due to the inherent limitations of voice recognition software.  Read the chart carefully and recognize, using context, where substitutions have occurred. Amount and/or Complexity of Data Reviewed  Radiology: ordered and independent interpretation performed.       Risk  OTC drugs.  Prescription drug management. Disposition  Final diagnoses:   Back pain   Migraine     Time reflects when diagnosis was documented in both MDM as applicable and the Disposition within this note     Time User Action Codes Description Comment    9/14/2023  1:22 PM Juvenal Douglas Add [M54.9] Back pain     9/14/2023  1:22 PM Kurtis Hanley, [address] [F81.762] Migraine       ED Disposition     ED Disposition   Discharge    Condition   Stable    Date/Time   Thu Sep 14, 2023  1:22 PM    Comment   Seema Olsen discharge to home/self care.                Follow-up Information     Follow up With Specialties Details Why Contact Info Additional [address] [facility] Emergency Department Emergency Medicine Go to  If symptoms worsen [address] [facility] Emergency Department, Vasu Combs [place], Dennis Eaton MD Internal Medicine Schedule an appointment as soon as possible for a visit  As needed 1 [facility] [address] [place] [address]  [phone]             Discharge Medication List as of 9/14/2023  2:56 PM      START taking these medications    Details   predniSONE 20 mg tablet Take 1 tablet (20 mg total) by mouth 2 (two) times a day with meals for 5 days, Starting Thu 9/14/2023, Until Tue 9/19/2023, Normal         CONTINUE these medications which have NOT CHANGED    Details   albuterol (PROVENTIL HFA,VENTOLIN HFA) 90 mcg/act inhaler INHALE 2 PUFFS EVERY 6 HOURS AS NEEDED FOR WHEEZING OR SHORTNESS OF BREATH FOR UP TO 30 DAYS., Historical Med      clarithromycin (BIAXIN) 250 mg tablet Starting Mon 3/6/2023, Historical Med      Etonogestrel (NEXPLANON SC) Inject under the skin, Historical Med      famotidine (PEPCID) 20 mg tablet Take 1 tablet (20 mg total) by mouth 2 (two) times a day for 2 days, Starting Fri 2/18/2022, Until Sun 2/20/2022, Normal      fluticasone (FLONASE) 50 mcg/act nasal spray 1 spray into each nostril daily for 10 days, Starting Thu 6/9/2022, Until Wed 9/21/2022, Normal      meloxicam (Mobic) 15 mg tablet Take 1 tablet (15 mg total) by mouth daily, Starting Thu 9/14/2023, Until Sat 10/14/2023, Normal      methylPREDNISolone 4 MG tablet therapy pack Use as directed on package, Normal      nortriptyline (PAMELOR) 10 mg capsule Take 1 capsule (10 mg total) by mouth daily at bedtime, Starting Tue 3/14/2023, Normal      ondansetron (ZOFRAN) 4 mg tablet Take 1 tablet (4 mg total) by mouth every 6 (six) hours for 2 days, Starting Fri 2/18/2022, Until Tue 3/14/2023, Normal      oxyCODONE (OXY-IR) 5 MG capsule Take 1 capsule (5 mg total) by mouth every 6 (six) hours as needed for severe pain for up to 5 doses Can substitute oxycodone tablets for capsules as needed for severe pain Max Daily Amount: 20 mg, Starting Thu 5/25/2023, Normal      pregabalin (LYRICA) 25 mg capsule Take 1 capsule (25 mg total) by mouth 2 (two) times a day, Starting Mon 12/19/2022, Normal      sertraline (ZOLOFT) 50 mg tablet TAKE 1.5 TABLETS BY MOUTH EVERY DAY, Normal      sucralfate (CARAFATE) 1 g tablet Take 1 tablet (1 g total) by mouth 4 (four) times a day for 2 days, Starting Fri 2/18/2022, Until Sun 2/20/2022, Normal      SUMAtriptan (Imitrex) 50 mg tablet Take 1 tablet (50 mg total) by mouth once as needed for migraine If no improvement in 2 hours, you may take one more tablet. Please do not take more than 2 tablets in 24 hours, Starting Tue 3/14/2023, Normal             No discharge procedures on file.     PDMP Review       Value Time User    PDMP Reviewed  Yes 10/24/2022  2:40 PM [facility] Provider  Electronically Signed by           Suly Espinosa PA-C  09/14/23 [id number]

## 2023-09-14 NOTE — TELEPHONE ENCOUNTER
S/W pt and advised of the same. Nurse advised pt to refrain from NSAID use while taking medrol dose pack. Pt verbalized understanding and appreciative of call.

## 2023-09-14 NOTE — TELEPHONE ENCOUNTER
Caller: pt    Doctor: stephan    Reason for call: pt called back, she thought she missed a call from us.     Call back#: [phone]

## 2023-09-14 NOTE — TELEPHONE ENCOUNTER
S/W pt who is reporting 2 nights ago her son jumped on her back while they were playing on the floor. She reports pain in tailbone up her spine into her neck, a headache, and pain into left lower leg. She took left over oxycodone yesterday with some relief but has no more. Pt has taken tylenol and ibuprofen but reports they do nothing for her. Nurse advised taking up to 3,000mg in 24 hours of tylenol with alternating with 2,400 mg of ibuprofen in 24 hours consistently to see if she has any relief. Nurse also advised ice/heat, OTC pain creams and lidocaine patches. Patient became tearful during call, nurse advised pt to report to nearest ED if pain is unbearable or loss of mobility, loss of bowel/bladder. Nurse informed pt nurse to discuss with HS and CB with any further recommendations. Pt verbalized understanding and appreciative of call. Please advise.

## 2023-09-29 ENCOUNTER — OFFICE VISIT (OUTPATIENT)
Dept: PAIN MEDICINE | Facility: CLINIC | Age: 38
End: 2023-09-29
Payer: COMMERCIAL

## 2023-09-29 VITALS
SYSTOLIC BLOOD PRESSURE: 126 MMHG | BODY MASS INDEX: 30.34 KG/M2 | HEIGHT: 68 IN | WEIGHT: 200.2 LBS | DIASTOLIC BLOOD PRESSURE: 83 MMHG

## 2023-09-29 DIAGNOSIS — M50.30 DDD (DEGENERATIVE DISC DISEASE), CERVICAL: ICD-10-CM

## 2023-09-29 DIAGNOSIS — M54.2 NECK PAIN: Primary | ICD-10-CM

## 2023-09-29 DIAGNOSIS — F41.9 ANXIETY: ICD-10-CM

## 2023-09-29 DIAGNOSIS — M54.40 LOW BACK PAIN WITH SCIATICA, SCIATICA LATERALITY UNSPECIFIED, UNSPECIFIED BACK PAIN LATERALITY, UNSPECIFIED CHRONICITY: ICD-10-CM

## 2023-09-29 DIAGNOSIS — M79.18 MYOFASCIAL PAIN: ICD-10-CM

## 2023-09-29 DIAGNOSIS — M51.36 DDD (DEGENERATIVE DISC DISEASE), LUMBAR: ICD-10-CM

## 2023-09-29 DIAGNOSIS — R29.2 HYPERREFLEXIA: ICD-10-CM

## 2023-09-29 PROBLEM — M51.369 DDD (DEGENERATIVE DISC DISEASE), LUMBAR: Status: ACTIVE | Noted: 2023-09-29

## 2023-09-29 PROCEDURE — 99214 OFFICE O/P EST MOD 30 MIN: CPT | Performed by: ANESTHESIOLOGY

## 2023-09-29 RX ORDER — TIZANIDINE 2 MG/1
2 TABLET ORAL EVERY 8 HOURS PRN
Qty: 90 TABLET | Refills: 1 | Status: SHIPPED | OUTPATIENT
Start: 2023-09-29

## 2023-09-29 RX ORDER — LORAZEPAM 1 MG/1
TABLET ORAL
Qty: 2 TABLET | Refills: 0 | Status: SHIPPED | OUTPATIENT
Start: 2023-09-29

## 2023-09-29 NOTE — PROGRESS NOTES
Assessment  1. Neck pain    2. Low back pain with sciatica, sciatica laterality unspecified, unspecified back pain laterality, unspecified chronicity    3. Myofascial pain    4. DDD (degenerative disc disease), cervical    5. DDD (degenerative disc disease), lumbar    6. Hyperreflexia        Plan  50-year-old female previously seen by Dr. Jay Hay, transferring care presenting for interval follow-up regarding lumbosacral back pain that radiates into the lateral aspect of the left lower extremity to the foot with associated numbness. Patient has been dealing with the symptoms for many years, however had a recent exacerbation when her young son jumped on her low back. She also has chronic neck pain radiating into the occiput. X-ray of the lumbar spine was unremarkable. Last MRI of the lumbar spine was from 2015 demonstrating small left-sided disc protrusion at L5-S1. MRI of the cervical spine from 2019 demonstrates disc bulge at C5-6 with mild right foraminal stenosis. She has not done any recent formal physical therapy or chiropractic treatment for her cervical or lumbar complaints. Gabapentin was ineffective in the past.  Lyrica caused her to be angry. She does take nortriptyline and cyclobenzaprine as well as sumatriptan which is prescribed by neurology. She gets slight relief with these. Tylenol and NSAIDs provide minimal relief. Patient's neck pain seems to be mostly myofascial in nature. However, the patient does have hyperreflexia and a positive Weeks's reflex on the left concerning for myelopathy. I do feel an MRI of the cervical spine without contrast is warranted in this case. The patient also demonstrates symptoms consistent with L5-S1 radiculopathy on the left. 1.  I will order an MRI of the cervical spine without contrast  2. I will prescribe physical therapy for the patient cervical and lumbar complaints  3.   I will discontinue cyclobenzaprine and trial tizanidine 2 mg every 8 hours as needed for her myofascial pain  4. I will follow-up with the patient in 6 weeks and if she does not respond to conservative treatment we will order an MRI of the lumbar spine without contrast.  We will contact her with results of MRI of cervical spine once received        My impressions and treatment recommendations were discussed in detail with the patient who verbalized understanding and had no further questions. Discharge instructions were provided. I personally saw and examined the patient and I agree with the above discussed plan of care. No orders of the defined types were placed in this encounter. No orders of the defined types were placed in this encounter. History of Present Illness    Tory Powell is a 41 y.o. female previously seen by Dr. Maile Barker, transferring care presenting for interval follow-up regarding lumbosacral back pain that radiates into the lateral aspect of the left lower extremity to the foot with associated numbness. He denies any right lower extremity symptoms, bladder or bowel incontinence, or saddle anesthesia. Patient has been dealing with the symptoms for many years, however had a recent exacerbation when her young son jumped on her low back. She also has chronic neck pain radiating into the occiput. She denies any radicular symptoms into the upper extremities. X-ray of the lumbar spine was unremarkable. Last MRI of the lumbar spine was from 2015 demonstrating small left-sided disc protrusion at L5-S1. MRI of the cervical spine from 2019 demonstrates disc bulge at C5-6 with mild right foraminal stenosis. She has not done any recent formal physical therapy or chiropractic treatment for her cervical or lumbar complaints. Gabapentin was ineffective in the past.  Lyrica caused her to be angry. She does take nortriptyline and cyclobenzaprine as well as sumatriptan which is prescribed by neurology. She gets slight relief with these.   Tylenol and NSAIDs provide minimal relief. The patient rates her pain moderate and constant. The pain does not follow any particular pattern throughout the day. The pain is described as burning, dull, aching, sharp, throbbing, shooting, and numbness. The pain is increased with standing, walking, bending, and exercise. The pain is alleviated with sitting, relaxation, and lying down. Other than as stated above, the patient denies any interval changes in medications, medical condition, mental condition, symptoms, or allergies since the last office visit. I have personally reviewed and/or updated the patient's past medical history, past surgical history, family history, social history, current medications, allergies, and vital signs today. Review of Systems   Constitutional: Negative for fever and unexpected weight change. HENT: Negative for trouble swallowing. Eyes: Negative for visual disturbance. Respiratory: Negative for shortness of breath and wheezing. Cardiovascular: Negative for chest pain and palpitations. Gastrointestinal: Negative for constipation, diarrhea, nausea and vomiting. Endocrine: Negative for cold intolerance, heat intolerance and polydipsia. Genitourinary: Negative for difficulty urinating and frequency. Musculoskeletal: Negative for arthralgias, gait problem, joint swelling and myalgias. Skin: Negative for rash. Neurological: Positive for dizziness. Negative for seizures, syncope, weakness and headaches. Hematological: Does not bruise/bleed easily. Psychiatric/Behavioral: Negative for dysphoric mood. All other systems reviewed and are negative.       Patient Active Problem List   Diagnosis   • Anxiety   • Chronic left-sided low back pain with left-sided sciatica   • Chronic headache   • Migraine without aura and without status migrainosus, not intractable   • S/P primary low transverse    • Nexplanon in place   • Weight gain   • Viral infection, unspecified   • Diarrhea   • Sore throat   • Earache   • Acute nonintractable headache   • Seasonal allergies   • Bilateral occipital neuralgia       Past Medical History:   Diagnosis Date   • Anemia     20 years ago   • Concussion    • Migraine    • Urinary tract infection     In the past    • Varicella        Past Surgical History:   Procedure Laterality Date   • FINGER SURGERY Left    • [place]  DELIVERY ONLY N/A 2021    Procedure:  SECTION ();   Surgeon: Ze Parikh MD;  Location: [facility] ;  Service: Obstetrics   • WISDOM TOOTH EXTRACTION Bilateral        Family History   Problem Relation Age of Onset   • Hyperlipidemia Mother    • Transient ischemic attack Father    • Diabetes type II Father    • Stroke Father         mini stroke   • Dementia Maternal Grandmother    • Heart attack Paternal Grandmother    • No Known Problems Sister        Social History     Occupational History   • Not on file   Tobacco Use   • Smoking status: Never   • Smokeless tobacco: Never   Vaping Use   • Vaping Use: Never used   Substance and Sexual Activity   • Alcohol use: Yes     Comment: social   • Drug use: Never   • Sexual activity: Yes     Partners: Male     Birth control/protection: Implant       Current Outpatient Medications on File Prior to Visit   Medication Sig   • albuterol (PROVENTIL HFA,VENTOLIN HFA) 90 mcg/act inhaler INHALE 2 PUFFS EVERY 6 HOURS AS NEEDED FOR WHEEZING OR SHORTNESS OF BREATH FOR UP TO 30 DAYS. (Patient not taking: Reported on 10/24/2022)   • clarithromycin (BIAXIN) 250 mg tablet    • Etonogestrel (NEXPLANON SC) Inject under the skin   • famotidine (PEPCID) 20 mg tablet Take 1 tablet (20 mg total) by mouth 2 (two) times a day for 2 days   • fluticasone (FLONASE) 50 mcg/act nasal spray 1 spray into each nostril daily for 10 days   • meloxicam (Mobic) 15 mg tablet Take 1 tablet (15 mg total) by mouth daily   • methylPREDNISolone 4 MG tablet therapy pack Use as directed on package   • nortriptyline (PAMELOR) 10 mg capsule Take 1 capsule (10 mg total) by mouth daily at bedtime   • ondansetron (ZOFRAN) 4 mg tablet Take 1 tablet (4 mg total) by mouth every 6 (six) hours for 2 days   • oxyCODONE (OXY-IR) 5 MG capsule Take 1 capsule (5 mg total) by mouth every 6 (six) hours as needed for severe pain for up to 5 doses Can substitute oxycodone tablets for capsules as needed for severe pain Max Daily Amount: 20 mg   • pregabalin (LYRICA) 25 mg capsule Take 1 capsule (25 mg total) by mouth 2 (two) times a day (Patient not taking: Reported on 3/14/2023)   • sertraline (ZOLOFT) 50 mg tablet TAKE 1.5 TABLETS BY MOUTH EVERY DAY   • sucralfate (CARAFATE) 1 g tablet Take 1 tablet (1 g total) by mouth 4 (four) times a day for 2 days (Patient not taking: Reported on 6/14/2022)   • SUMAtriptan (Imitrex) 50 mg tablet Take 1 tablet (50 mg total) by mouth once as needed for migraine If no improvement in 2 hours, you may take one more tablet. Please do not take more than 2 tablets in 24 hours     No current facility-administered medications on file prior to visit. Allergies   Allergen Reactions   • Penicillins        Physical Exam    /83   Ht 5' 8" (1.727 m)   Wt 90.8 kg (200 lb 3.2 oz)   BMI 30.44 kg/m²     Constitutional: normal, well developed, well nourished, alert, in no distress and non-toxic and no overt pain behavior. Eyes: anicteric  HEENT: grossly intact  Neck: supple, symmetric, trachea midline and no masses   Pulmonary:even and unlabored  Cardiovascular:No edema or pitting edema present  Skin:Normal without rashes or lesions and well hydrated  Psychiatric:Mood and affect appropriate  Neurologic:Cranial Nerves II-XII grossly intact  Musculoskeletal:normal gait. Bilateral cervical paraspinals and trapezii tender to palpation and ropey texture. Bilateral biceps, triceps, brachioradialis, patellar, and Achilles reflexes were 3-4 and symmetrical.  Positive Weeks's reflex on the left.   No clonus is noted bilaterally. Bilateral upper extremity strength 5 out of 5 in all muscle groups. Sensation intact to light touch in C5-T1 dermatomes bilaterally. Negative Spurling's bilaterally. Bilateral lumbar paraspinals tender to palpation from L4-S1. Bilateral SI joints mildly tender to palpation. Bilateral lower extremity strength 5 out of 5 in all muscle groups. Sensation intact to light touch in L3-S1 dermatomes bilaterally. Negative straight leg raise bilaterally. Negative José's test bilaterally. Imaging      PACS Images     Show images for XR spine lumbar 2 or 3 views injury  Study Result    Narrative & Impression   LUMBAR SPINE     INDICATION:   Lower lumbar pain. .     COMPARISON:  None     VIEWS:  XR SPINE LUMBAR 2 OR 3 VIEWS INJURY  Images: 3     FINDINGS:     There are 5 non rib bearing lumbar vertebral bodies.     There is no evidence of acute fracture or destructive osseous lesion.     Alignment is unremarkable.     No significant lumbar degenerative change noted.     The pedicles appear intact. SI joints appear normal.     Soft tissues are unremarkable.     IMPRESSION:     Normal examination.        Workstation performed: [id number]     MRI Lumbar Spine wo contrast  Order: [id number]  Impression    IMPRESSION:     Mild disc degeneration at L5-S1, with a small left paracentral disc protrusion. No spinal canal stenosis or neural foraminal stenosis. Narrative    MRI OF THE LUMBAR SPINE:     HISTORY: PT HAS INJURY TO LEFT SCIATICA     COMPARISON: None. TECHNIQUE: On a 3 Kavitha scanner, sagittal T1-weighted, T2-weighted and STIR images and axial proton-density and T2-weighted images were obtained. FINDINGS:     There is a normal lumbar lordosis. There is a mild levoscoliosis of the lumbar spine centered at L3-L4. Vertebral body heights, alignment, and marrow signal are within normal limits. There is mild disc desiccation and narrowing at L5-S1.  The remaining discs are normal in height and T2-weighted signal.     From T12-L1 through L4-L5, there is no disc protrusion, spinal canal stenosis, or neural foraminal stenosis. At L5-S1, there is a 2-3 mm left paracentral a disc protrusion and annular fissure, which mildly narrows the left lateral recess but does not displace the traversing left S1 nerve roots. There is no significant spinal canal stenosis or neural foraminal   stenosis. There is no mass or hematoma in the spinal canal. The conus medullaris is normal in contour and position. The paravertebral soft tissues are normal.  Exam End: 07/30/15  8:32 PM    Specimen Collected: 07/30/15  7:38 PM Last Resulted: 07/31/15  4:06 PM   Received From: [facility]  Result Received: 05/25/23  9:01 AM       MRI CERVICAL SPINE WO CONTRAST  Order: [id number]  Impression    Impression: Mild degenerative findings are present centered at C5-6. [id number]  Narrative      History: Neck pain     Procedure: MRI of the cervical spine without contrast October 6, 2019     Technique: Sagittal and axial imaging of the cervical spine was performed   emphasizing T1, T2 and T2 *         Comparison: None     Findings:   Alignment: Is mild straightening of the normal lordosis     Bone Marrow: Bone marrow is unremarkable     CSF/Spinal Cord: Is normal in signal     Visualized Posterior Fossa/Foramen Magnum: Normal     Levels:   C2-3: Normal   C3-4: Unremarkable   C4-5: Unremarkable   C5-6: Mild disc bulge Is present with foraminal narrowing on the right.  The left   foramen is patent and there is no central stenosis   C6-7: Unremarkable   C7-T1: Unremarkable     Visualized Upper Thoracic Spine: Normal   Paraspinal Soft Tissues: Unremarkable  Exam End: 12/26/19  3:34 PM    Specimen Collected: 12/27/19  8:38 AM Last Resulted: 12/27/19  8:47 AM   Received From: [id number]  Result Received: 05/25/23  9:05 AM

## 2023-10-16 DIAGNOSIS — F41.9 ANXIETY: ICD-10-CM

## 2023-11-06 ENCOUNTER — OFFICE VISIT (OUTPATIENT)
Dept: FAMILY MEDICINE CLINIC | Facility: CLINIC | Age: 38
End: 2023-11-06
Payer: COMMERCIAL

## 2023-11-06 VITALS
WEIGHT: 203.6 LBS | HEIGHT: 68 IN | HEART RATE: 78 BPM | BODY MASS INDEX: 30.86 KG/M2 | OXYGEN SATURATION: 98 % | DIASTOLIC BLOOD PRESSURE: 88 MMHG | SYSTOLIC BLOOD PRESSURE: 128 MMHG | TEMPERATURE: 99.5 F

## 2023-11-06 DIAGNOSIS — E55.9 VITAMIN D INSUFFICIENCY: ICD-10-CM

## 2023-11-06 DIAGNOSIS — D51.1 VIT B12 DEFIC ANEMIA D/T SLCTV VIT B12 MALABSORP W PROTEIN: ICD-10-CM

## 2023-11-06 DIAGNOSIS — Z76.89 ENCOUNTER TO ESTABLISH CARE WITH NEW DOCTOR: ICD-10-CM

## 2023-11-06 DIAGNOSIS — G89.29 CHRONIC LEFT-SIDED LOW BACK PAIN WITH LEFT-SIDED SCIATICA: Primary | ICD-10-CM

## 2023-11-06 DIAGNOSIS — R51.9 FRONTAL HEADACHE: ICD-10-CM

## 2023-11-06 DIAGNOSIS — Z79.899 MEDICATION MANAGEMENT: ICD-10-CM

## 2023-11-06 DIAGNOSIS — M54.42 CHRONIC LEFT-SIDED LOW BACK PAIN WITH LEFT-SIDED SCIATICA: Primary | ICD-10-CM

## 2023-11-06 DIAGNOSIS — F41.9 ANXIETY: ICD-10-CM

## 2023-11-06 DIAGNOSIS — Z71.2 ENCOUNTER TO DISCUSS TEST RESULTS: ICD-10-CM

## 2023-11-06 DIAGNOSIS — Z79.899 ENCOUNTER FOR LONG-TERM (CURRENT) USE OF MEDICATIONS: ICD-10-CM

## 2023-11-06 DIAGNOSIS — R53.83 FATIGUE, UNSPECIFIED TYPE: ICD-10-CM

## 2023-11-06 DIAGNOSIS — G47.33 OSA (OBSTRUCTIVE SLEEP APNEA): ICD-10-CM

## 2023-11-06 DIAGNOSIS — Z11.59 NEED FOR HEPATITIS C SCREENING TEST: ICD-10-CM

## 2023-11-06 PROCEDURE — 99215 OFFICE O/P EST HI 40 MIN: CPT | Performed by: FAMILY MEDICINE
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
## 2023-11-07 NOTE — ASSESSMENT & PLAN NOTE
I encouraged her to perform sleep study because sleep apnea can propagates headache. I discussed the side effects of sleep apnea and how it increases her headache the whole day. I referred her to a pulmonologist to be able to undergo sleep study.   She never underwent sleep apnea testing but was suggested in past.

## 2023-11-07 NOTE — ASSESSMENT & PLAN NOTE
I discussed with her that disadvantages of taking pain medication and how it affects her blood pressure and kidney function.

## 2024-01-05 PROBLEM — Z11.59 NEED FOR HEPATITIS C SCREENING TEST: Status: RESOLVED | Noted: 2023-11-06 | Resolved: 2024-01-05

## 2024-02-12 ENCOUNTER — HOSPITAL ENCOUNTER (EMERGENCY)
Facility: HOSPITAL | Age: 39
Discharge: HOME | End: 2024-02-12
Attending: EMERGENCY MEDICINE
Payer: COMMERCIAL

## 2024-02-12 VITALS
BODY MASS INDEX: 31.01 KG/M2 | OXYGEN SATURATION: 97 % | HEIGHT: 68 IN | WEIGHT: 204.59 LBS | SYSTOLIC BLOOD PRESSURE: 130 MMHG | HEART RATE: 105 BPM | RESPIRATION RATE: 20 BRPM | DIASTOLIC BLOOD PRESSURE: 77 MMHG | TEMPERATURE: 98.4 F

## 2024-02-12 DIAGNOSIS — J10.1 INFLUENZA A: Primary | ICD-10-CM

## 2024-02-12 DIAGNOSIS — R05.1 ACUTE COUGH: ICD-10-CM

## 2024-02-12 DIAGNOSIS — M79.10 MYALGIA: ICD-10-CM

## 2024-02-12 DIAGNOSIS — R51.9 HEADACHE: ICD-10-CM

## 2024-02-12 LAB
ALBUMIN SERPL BCP-MCNC: 4.1 G/DL (ref 3.5–5)
ALP SERPL-CCNC: 29 U/L (ref 34–104)
ALT SERPL W P-5'-P-CCNC: 22 U/L (ref 7–52)
ANION GAP SERPL CALCULATED.3IONS-SCNC: 9 MMOL/L
AST SERPL W P-5'-P-CCNC: 19 U/L (ref 13–39)
BASOPHILS # BLD AUTO: 0.02 THOUSANDS/ÂΜL (ref 0–0.1)
BASOPHILS NFR BLD AUTO: 0 % (ref 0–1)
BILIRUB SERPL-MCNC: 0.61 MG/DL (ref 0.2–1)
BUN SERPL-MCNC: 6 MG/DL (ref 5–25)
CALCIUM SERPL-MCNC: 8.7 MG/DL (ref 8.4–10.2)
CHLORIDE SERPL-SCNC: 102 MMOL/L (ref 96–108)
CK SERPL-CCNC: 63 U/L (ref 26–192)
CO2 SERPL-SCNC: 24 MMOL/L (ref 21–32)
CREAT SERPL-MCNC: 0.85 MG/DL (ref 0.6–1.3)
EOSINOPHIL # BLD AUTO: 0 THOUSAND/ÂΜL (ref 0–0.61)
EOSINOPHIL NFR BLD AUTO: 0 % (ref 0–6)
ERYTHROCYTE [DISTWIDTH] IN BLOOD BY AUTOMATED COUNT: 12.7 % (ref 11.6–15.1)
FLUAV RNA RESP QL NAA+PROBE: POSITIVE
FLUBV RNA RESP QL NAA+PROBE: NEGATIVE
GFR SERPL CREATININE-BSD FRML MDRD: 87 ML/MIN/1.73SQ M
GLUCOSE SERPL-MCNC: 108 MG/DL (ref 65–140)
HCT VFR BLD AUTO: 37.5 % (ref 34.8–46.1)
HGB BLD-MCNC: 12.9 G/DL (ref 11.5–15.4)
IMM GRANULOCYTES # BLD AUTO: 0.04 THOUSAND/UL (ref 0–0.2)
IMM GRANULOCYTES NFR BLD AUTO: 0 % (ref 0–2)
LYMPHOCYTES # BLD AUTO: 1.8 THOUSANDS/ÂΜL (ref 0.6–4.47)
LYMPHOCYTES NFR BLD AUTO: 18 % (ref 14–44)
MCH RBC QN AUTO: 31.5 PG (ref 26.8–34.3)
MCHC RBC AUTO-ENTMCNC: 34.4 G/DL (ref 31.4–37.4)
MCV RBC AUTO: 92 FL (ref 82–98)
MONOCYTES # BLD AUTO: 0.42 THOUSAND/ÂΜL (ref 0.17–1.22)
MONOCYTES NFR BLD AUTO: 4 % (ref 4–12)
NEUTROPHILS # BLD AUTO: 7.88 THOUSANDS/ÂΜL (ref 1.85–7.62)
NEUTS SEG NFR BLD AUTO: 78 % (ref 43–75)
NRBC BLD AUTO-RTO: 0 /100 WBCS
PLATELET # BLD AUTO: 147 THOUSANDS/UL (ref 149–390)
PMV BLD AUTO: 10.4 FL (ref 8.9–12.7)
POTASSIUM SERPL-SCNC: 3.6 MMOL/L (ref 3.5–5.3)
PROT SERPL-MCNC: 7.4 G/DL (ref 6.4–8.4)
RBC # BLD AUTO: 4.09 MILLION/UL (ref 3.81–5.12)
RSV RNA RESP QL NAA+PROBE: NEGATIVE
S PYO DNA THROAT QL NAA+PROBE: NOT DETECTED
SARS-COV-2 RNA RESP QL NAA+PROBE: NEGATIVE
SODIUM SERPL-SCNC: 135 MMOL/L (ref 135–147)
WBC # BLD AUTO: 10.16 THOUSAND/UL (ref 4.31–10.16)

## 2024-02-12 PROCEDURE — 96365 THER/PROPH/DIAG IV INF INIT: CPT

## 2024-02-12 PROCEDURE — 96361 HYDRATE IV INFUSION ADD-ON: CPT

## 2024-02-12 PROCEDURE — 82550 ASSAY OF CK (CPK): CPT | Performed by: EMERGENCY MEDICINE

## 2024-02-12 PROCEDURE — 93005 ELECTROCARDIOGRAM TRACING: CPT

## 2024-02-12 PROCEDURE — 99284 EMERGENCY DEPT VISIT MOD MDM: CPT | Performed by: EMERGENCY MEDICINE

## 2024-02-12 PROCEDURE — 0241U HB NFCT DS VIR RESP RNA 4 TRGT: CPT | Performed by: EMERGENCY MEDICINE

## 2024-02-12 PROCEDURE — 87651 STREP A DNA AMP PROBE: CPT | Performed by: EMERGENCY MEDICINE

## 2024-02-12 PROCEDURE — 99283 EMERGENCY DEPT VISIT LOW MDM: CPT

## 2024-02-12 PROCEDURE — 80053 COMPREHEN METABOLIC PANEL: CPT | Performed by: EMERGENCY MEDICINE

## 2024-02-12 PROCEDURE — 36415 COLL VENOUS BLD VENIPUNCTURE: CPT | Performed by: EMERGENCY MEDICINE

## 2024-02-12 PROCEDURE — 96375 TX/PRO/DX INJ NEW DRUG ADDON: CPT

## 2024-02-12 PROCEDURE — 85025 COMPLETE CBC W/AUTO DIFF WBC: CPT | Performed by: EMERGENCY MEDICINE

## 2024-02-12 RX ORDER — KETOROLAC TROMETHAMINE 30 MG/ML
15 INJECTION, SOLUTION INTRAMUSCULAR; INTRAVENOUS ONCE
Status: COMPLETED | OUTPATIENT
Start: 2024-02-12 | End: 2024-02-12

## 2024-02-12 RX ORDER — OSELTAMIVIR PHOSPHATE 75 MG/1
75 CAPSULE ORAL ONCE
Status: COMPLETED | OUTPATIENT
Start: 2024-02-12 | End: 2024-02-12

## 2024-02-12 RX ORDER — METOCLOPRAMIDE HYDROCHLORIDE 5 MG/ML
10 INJECTION INTRAMUSCULAR; INTRAVENOUS ONCE
Status: COMPLETED | OUTPATIENT
Start: 2024-02-12 | End: 2024-02-12

## 2024-02-12 RX ORDER — MAGNESIUM SULFATE HEPTAHYDRATE 40 MG/ML
2 INJECTION, SOLUTION INTRAVENOUS ONCE
Status: COMPLETED | OUTPATIENT
Start: 2024-02-12 | End: 2024-02-12

## 2024-02-12 RX ORDER — SUCRALFATE 1 G/1
1 TABLET ORAL ONCE
Status: COMPLETED | OUTPATIENT
Start: 2024-02-12 | End: 2024-02-12

## 2024-02-12 RX ORDER — OSELTAMIVIR PHOSPHATE 75 MG/1
75 CAPSULE ORAL 2 TIMES DAILY
Qty: 10 CAPSULE | Refills: 0 | Status: SHIPPED | OUTPATIENT
Start: 2024-02-12 | End: 2024-02-17

## 2024-02-12 RX ORDER — SODIUM CHLORIDE, SODIUM LACTATE, POTASSIUM CHLORIDE, CALCIUM CHLORIDE 600; 310; 30; 20 MG/100ML; MG/100ML; MG/100ML; MG/100ML
1000 INJECTION, SOLUTION INTRAVENOUS ONCE
Status: COMPLETED | OUTPATIENT
Start: 2024-02-12 | End: 2024-02-12

## 2024-02-12 RX ORDER — NAPROXEN 500 MG/1
500 TABLET ORAL EVERY 12 HOURS PRN
Qty: 30 TABLET | Refills: 0 | Status: SHIPPED | OUTPATIENT
Start: 2024-02-12

## 2024-02-12 RX ORDER — DIPHENHYDRAMINE HYDROCHLORIDE 50 MG/ML
25 INJECTION INTRAMUSCULAR; INTRAVENOUS ONCE
Status: COMPLETED | OUTPATIENT
Start: 2024-02-12 | End: 2024-02-12

## 2024-02-12 RX ORDER — ACETAMINOPHEN 325 MG/1
975 TABLET ORAL ONCE
Status: COMPLETED | OUTPATIENT
Start: 2024-02-12 | End: 2024-02-12

## 2024-02-12 RX ORDER — METOCLOPRAMIDE 10 MG/1
10 TABLET ORAL EVERY 6 HOURS PRN
Qty: 30 TABLET | Refills: 0 | Status: SHIPPED | OUTPATIENT
Start: 2024-02-12

## 2024-02-12 RX ADMIN — DIPHENHYDRAMINE HYDROCHLORIDE 25 MG: 50 INJECTION, SOLUTION INTRAMUSCULAR; INTRAVENOUS at 10:45

## 2024-02-12 RX ADMIN — METOCLOPRAMIDE 10 MG: 5 INJECTION, SOLUTION INTRAMUSCULAR; INTRAVENOUS at 10:48

## 2024-02-12 RX ADMIN — SUCRALFATE 1 G: 1 TABLET ORAL at 09:44

## 2024-02-12 RX ADMIN — OSELTAMIVIR PHOSPHATE 75 MG: 75 CAPSULE ORAL at 11:44

## 2024-02-12 RX ADMIN — KETOROLAC TROMETHAMINE 15 MG: 30 INJECTION, SOLUTION INTRAMUSCULAR; INTRAVENOUS at 09:46

## 2024-02-12 RX ADMIN — ACETAMINOPHEN 975 MG: 325 TABLET, FILM COATED ORAL at 10:44

## 2024-02-12 RX ADMIN — MAGNESIUM SULFATE HEPTAHYDRATE 2 G: 40 INJECTION, SOLUTION INTRAVENOUS at 10:49

## 2024-02-12 RX ADMIN — SODIUM CHLORIDE, SODIUM LACTATE, POTASSIUM CHLORIDE, AND CALCIUM CHLORIDE 1000 ML/HR: .6; .31; .03; .02 INJECTION, SOLUTION INTRAVENOUS at 09:50

## 2024-02-12 NOTE — DISCHARGE INSTRUCTIONS
You have been given a prescription for Tamiflu to help shorten the course of your influenza.  Please take the evening dose tonight.  The medication is twice a day for 5 days.  If you develop worsening symptoms including severe headache, visual changes, confusion, high fevers, chest pain, shortness of breath, intractable nausea/vomiting, or any other concerning symptoms please return to the emergency department as he is can be signs of worsening illness.  Otherwise, you can follow-up with your primary care doctor within 1 week if you continue to have mild symptoms.

## 2024-02-12 NOTE — ED PROVIDER NOTES
History  Chief Complaint   Patient presents with    Fever     Patient has had fever (103-104) since Friday and cough with mucus production      HPI    38-year-old woman presenting to the emergency department with fever Tmax 104 °F today with symptoms since 3 days ago with developing cough, sore throat, body aches, fatigue, nausea.  No significant past medical history.  She reports that she has been taking over-the-counter medications for her fever and discomfort with little improvement in how she feels.  She presented to the emergency department due to high-grade fever.  No sick contacts, however she notes that ever since she developed her symptoms, her child has started to now develop a cough/uri symptoms.  She also endorses some mild chest wall soreness and abdominal soreness while coughing but otherwise that she denies overt chest pain, shortness of breath, wheezing, headache, visual changes, neck pain, vomiting, urinary symptoms, changes in stool, leg pain or leg swelling, rash.  No recent travel.    Prior to Admission Medications   Prescriptions Last Dose Informant Patient Reported? Taking?   Etonogestrel (NEXPLANON SC)  Self Yes No   Sig: Inject under the skin   LORazepam (ATIVAN) 1 mg tablet  Self No No   Sig: Take 1 tablet 45 minutes prior to MRI and may take additional tablet 15 minutes prior to MRI as needed for anxiety   Patient not taking: Reported on 11/6/2023   meloxicam (Mobic) 15 mg tablet   No No   Sig: Take 1 tablet (15 mg total) by mouth daily   nortriptyline (PAMELOR) 10 mg capsule  Self No No   Sig: Take 1 capsule (10 mg total) by mouth daily at bedtime   sertraline (ZOLOFT) 50 mg tablet   No No   Sig: Take 1.5 tablets (75 mg total) by mouth daily   tiZANidine (ZANAFLEX) 2 mg tablet  Self No No   Sig: Take 1 tablet (2 mg total) by mouth every 8 (eight) hours as needed for muscle spasms      Facility-Administered Medications: None       Past Medical History:   Diagnosis Date    Anemia     20  years ago    Concussion     Migraine     Urinary tract infection     In the past     Varicella        Past Surgical History:   Procedure Laterality Date    FINGER SURGERY Left     DC  DELIVERY ONLY N/A 2021    Procedure:  SECTION ();  Surgeon: Maria Esther Shen MD;  Location: [facility] ;  Service: Obstetrics    WISDOM TOOTH EXTRACTION Bilateral        Family History   Problem Relation Age of Onset    Hyperlipidemia Mother     Transient ischemic attack Father     Diabetes type II Father     Stroke Father         mini stroke    Dementia Maternal Grandmother     Heart attack Paternal Grandmother     No Known Problems Sister      I have reviewed and agree with the history as documented.    E-Cigarette/Vaping    E-Cigarette Use Never User      E-Cigarette/Vaping Substances    Nicotine No     THC No     CBD No     Flavoring No     Other No     Unknown No      Social History     Tobacco Use    Smoking status: Never    Smokeless tobacco: Never   Vaping Use    Vaping status: Never Used   Substance Use Topics    Alcohol use: Yes     Alcohol/week: 28.0 standard drinks of alcohol     Types: 28 Shots of liquor per week     Comment: 3-4 daily -- titos    Drug use: Never       Review of Systems   Constitutional:  Positive for activity change, appetite change, fatigue and fever.   Respiratory:  Positive for cough.    Cardiovascular:  Positive for chest pain (Chest wall soreness).   Gastrointestinal:  Positive for abdominal pain (Abdominal wall soreness) and nausea.   Neurological:  Positive for weakness.       Physical Exam  Physical Exam  Vitals and nursing note reviewed.   Constitutional:       General: She is in acute distress.      Appearance: She is well-developed. She is not ill-appearing, toxic-appearing or diaphoretic.      Comments: Tired/fatigued, appears uncomfortable   HENT:      Head: Normocephalic and atraumatic.      Right Ear: External ear normal.      Left Ear: External ear normal.       Nose: Nose normal.      Mouth/Throat:      Mouth: Mucous membranes are dry.   Eyes:      General:         Right eye: No discharge.         Left eye: No discharge.      Extraocular Movements: Extraocular movements intact.      Conjunctiva/sclera: Conjunctivae normal.      Pupils: Pupils are equal, round, and reactive to light.   Cardiovascular:      Rate and Rhythm: Regular rhythm. Tachycardia present.      Heart sounds: Normal heart sounds. No murmur heard.     No friction rub. No gallop.   Pulmonary:      Effort: Pulmonary effort is normal. No respiratory distress.      Breath sounds: Normal breath sounds. No stridor. No wheezing, rhonchi or rales.   Chest:      Chest wall: No tenderness.   Abdominal:      General: Bowel sounds are normal. There is no distension.      Palpations: Abdomen is soft. There is no mass.      Tenderness: There is no abdominal tenderness. There is no guarding or rebound.      Hernia: No hernia is present.   Musculoskeletal:         General: No tenderness or deformity. Normal range of motion.      Cervical back: Normal range of motion and neck supple.      Right lower leg: No edema.      Left lower leg: No edema.   Skin:     General: Skin is warm and dry.      Capillary Refill: Capillary refill takes less than 2 seconds.   Neurological:      Mental Status: She is alert and oriented to person, place, and time.         Vital Signs  ED Triage Vitals   Temperature Pulse Respirations Blood Pressure SpO2   02/12/24 0904 02/12/24 0903 02/12/24 0903 02/12/24 0903 02/12/24 0903   98.4 °F (36.9 °C) 105 20 130/77 97 %      Temp src Heart Rate Source Patient Position - Orthostatic VS BP Location FiO2 (%)   -- -- -- -- --             Pain Score       02/12/24 0909       6           Vitals:    02/12/24 0903   BP: 130/77   Pulse: 105         Visual Acuity      ED Medications  Medications - No data to display    Diagnostic Studies  Results Reviewed       Procedure Component Value Units Date/Time     FLU/RSV/COVID - if FLU/RSV clinically relevant [[id number]] Collected: 02/12/24 0913    Lab Status: In process Specimen: Nares from Nose Updated: 02/12/24 0917                   No orders to display              Procedures  Procedures         ED Course  ED Course as of 02/12/24 1508   Mon Feb 12, 2024   0932 38-year-old woman presenting to the emergency department with fever, myalgias, sore throat, cough.  Vital signs on arrival are remarkable for tachycardia and tachypnea.   0933 Pulse: 105   0933 Respirations: 20    Her physical exam is remarkable for a fatigued woman, tired appearing, no respiratory distress, appears otherwise uncomfortable.  Heart, lungs, abdomen within normal limits.   1026 Clinical presentation puts patient at risk for the following differential diagnoses:     COVID, influenza, RSV, other viral syndrome, strep pharyngitis, rhabdomyolysis, elevated LFTs, LORRAINE.  Lab work was ordered.    15 mg IV Toradol was given for pain control.  1 g Carafate was given for reflux like discomfort.   1027 INFLU A PCR(!): Positive    CBC, CMP otherwise grossly unremarkable.   1150 Upon re-assessment, patient feels improved.  Patient remained hemodynamically and clinically stable in the ED.  Strict return precautions given.  Patient verbalized understanding and will follow up as outpatient with PMD.  Upon discharge, patient was AO4, GCS15, and fully ambulatory.                                   SBIRT 22yo+      Flowsheet Row Most Recent Value   Initial Alcohol Screen: US AUDIT-C     1. How often do you have a drink containing alcohol? 0 Filed at: 02/12/2024 0905   2. How many drinks containing alcohol do you have on a typical day you are drinking?  0 Filed at: 02/12/2024 0905   3a. Male UNDER 65: How often do you have five or more drinks on one occasion? 0 Filed at: 02/12/2024 0905   3b. FEMALE Any Age, or MALE 65+: How often do you have 4 or more drinks on one occassion? 0 Filed at: 02/12/2024 0905   Audit-C Score  0 Filed at: 02/12/2024 0905   KIRSTEN: How many times in the past year have you...    Used an illegal drug or used a prescription medication for non-medical reasons? Never Filed at: 02/12/2024 0905                      Medical Decision Making  Amount and/or Complexity of Data Reviewed  Labs: ordered. Decision-making details documented in ED Course.    Risk  OTC drugs.  Prescription drug management.             Disposition  Final diagnoses:   None     ED Disposition       None          Follow-up Information    None         Patient's Medications   Discharge Prescriptions    No medications on file       No discharge procedures on file.    PDMP Review         Value Time User    PDMP Reviewed  Yes 9/29/2023 12:19 PM David Smith DO            ED Provider  Electronically Signed by             Lorena Parsons MD  02/12/24 2372

## 2024-02-13 LAB
ATRIAL RATE: 99 BPM
P AXIS: 49 DEGREES
PR INTERVAL: 128 MS
QRS AXIS: 95 DEGREES
QRSD INTERVAL: 74 MS
QT INTERVAL: 336 MS
QTC INTERVAL: 431 MS
T WAVE AXIS: 26 DEGREES
VENTRICULAR RATE: 99 BPM

## 2024-02-29 ENCOUNTER — ANNUAL EXAM (OUTPATIENT)
Dept: OBGYN CLINIC | Facility: CLINIC | Age: 39
End: 2024-02-29
Payer: COMMERCIAL

## 2024-02-29 VITALS
SYSTOLIC BLOOD PRESSURE: 116 MMHG | DIASTOLIC BLOOD PRESSURE: 70 MMHG | BODY MASS INDEX: 31.83 KG/M2 | WEIGHT: 202.8 LBS | HEIGHT: 67 IN

## 2024-02-29 DIAGNOSIS — Z01.419 ENCOUNTER FOR GYNECOLOGICAL EXAMINATION WITHOUT ABNORMAL FINDING: Primary | ICD-10-CM

## 2024-02-29 DIAGNOSIS — Z00.00 HEALTHCARE MAINTENANCE: ICD-10-CM

## 2024-02-29 PROCEDURE — S0612 ANNUAL GYNECOLOGICAL EXAMINA: HCPCS | Performed by: OBSTETRICS & GYNECOLOGY

## 2024-03-29 ENCOUNTER — HOSPITAL ENCOUNTER (EMERGENCY)
Facility: HOSPITAL | Age: 39
Discharge: HOME | End: 2024-03-29
Attending: EMERGENCY MEDICINE
Payer: COMMERCIAL

## 2024-03-29 ENCOUNTER — APPOINTMENT (EMERGENCY)
Dept: RADIOLOGY | Facility: HOSPITAL | Age: 39
End: 2024-03-29
Payer: COMMERCIAL

## 2024-03-29 VITALS
DIASTOLIC BLOOD PRESSURE: 88 MMHG | SYSTOLIC BLOOD PRESSURE: 131 MMHG | HEART RATE: 91 BPM | OXYGEN SATURATION: 97 % | RESPIRATION RATE: 18 BRPM | TEMPERATURE: 98.7 F

## 2024-03-29 DIAGNOSIS — R42 LIGHTHEADEDNESS: Primary | ICD-10-CM

## 2024-03-29 DIAGNOSIS — R51.9 HEADACHE: ICD-10-CM

## 2024-03-29 LAB
ALBUMIN SERPL BCP-MCNC: 4.2 G/DL (ref 3.5–5)
ALP SERPL-CCNC: 45 U/L (ref 34–104)
ALT SERPL W P-5'-P-CCNC: 19 U/L (ref 7–52)
ANION GAP SERPL CALCULATED.3IONS-SCNC: 8 MMOL/L (ref 4–13)
AST SERPL W P-5'-P-CCNC: 20 U/L (ref 13–39)
BASOPHILS # BLD AUTO: 0.04 THOUSANDS/ÂΜL (ref 0–0.1)
BASOPHILS NFR BLD AUTO: 1 % (ref 0–1)
BILIRUB SERPL-MCNC: 1.01 MG/DL (ref 0.2–1)
BUN SERPL-MCNC: 12 MG/DL (ref 5–25)
CALCIUM SERPL-MCNC: 9.5 MG/DL (ref 8.4–10.2)
CARDIAC TROPONIN I PNL SERPL HS: <2 NG/L
CHLORIDE SERPL-SCNC: 108 MMOL/L (ref 96–108)
CO2 SERPL-SCNC: 20 MMOL/L (ref 21–32)
CREAT SERPL-MCNC: 0.76 MG/DL (ref 0.6–1.3)
EOSINOPHIL # BLD AUTO: 0.24 THOUSAND/ÂΜL (ref 0–0.61)
EOSINOPHIL NFR BLD AUTO: 3 % (ref 0–6)
ERYTHROCYTE [DISTWIDTH] IN BLOOD BY AUTOMATED COUNT: 13 % (ref 11.6–15.1)
EXT PREGNANCY TEST URINE: NEGATIVE
EXT. CONTROL: NORMAL
GFR SERPL CREATININE-BSD FRML MDRD: 100 ML/MIN/1.73SQ M
GLUCOSE SERPL-MCNC: 117 MG/DL (ref 65–140)
HCT VFR BLD AUTO: 39.1 % (ref 34.8–46.1)
HGB BLD-MCNC: 13.3 G/DL (ref 11.5–15.4)
IMM GRANULOCYTES # BLD AUTO: 0.02 THOUSAND/UL (ref 0–0.2)
IMM GRANULOCYTES NFR BLD AUTO: 0 % (ref 0–2)
LYMPHOCYTES # BLD AUTO: 2.77 THOUSANDS/ÂΜL (ref 0.6–4.47)
LYMPHOCYTES NFR BLD AUTO: 35 % (ref 14–44)
MCH RBC QN AUTO: 31.1 PG (ref 26.8–34.3)
MCHC RBC AUTO-ENTMCNC: 34 G/DL (ref 31.4–37.4)
MCV RBC AUTO: 91 FL (ref 82–98)
MONOCYTES # BLD AUTO: 0.39 THOUSAND/ÂΜL (ref 0.17–1.22)
MONOCYTES NFR BLD AUTO: 5 % (ref 4–12)
NEUTROPHILS # BLD AUTO: 4.49 THOUSANDS/ÂΜL (ref 1.85–7.62)
NEUTS SEG NFR BLD AUTO: 56 % (ref 43–75)
NRBC BLD AUTO-RTO: 0 /100 WBCS
PLATELET # BLD AUTO: 268 THOUSANDS/UL (ref 149–390)
PMV BLD AUTO: 10.5 FL (ref 8.9–12.7)
POTASSIUM SERPL-SCNC: 3.5 MMOL/L (ref 3.5–5.3)
PROT SERPL-MCNC: 7.6 G/DL (ref 6.4–8.4)
RBC # BLD AUTO: 4.28 MILLION/UL (ref 3.81–5.12)
SODIUM SERPL-SCNC: 136 MMOL/L (ref 135–147)
WBC # BLD AUTO: 7.95 THOUSAND/UL (ref 4.31–10.16)

## 2024-03-29 PROCEDURE — 36415 COLL VENOUS BLD VENIPUNCTURE: CPT

## 2024-03-29 PROCEDURE — 99284 EMERGENCY DEPT VISIT MOD MDM: CPT

## 2024-03-29 PROCEDURE — 80053 COMPREHEN METABOLIC PANEL: CPT

## 2024-03-29 PROCEDURE — 85025 COMPLETE CBC W/AUTO DIFF WBC: CPT

## 2024-03-29 PROCEDURE — 93005 ELECTROCARDIOGRAM TRACING: CPT

## 2024-03-29 PROCEDURE — 71046 X-RAY EXAM CHEST 2 VIEWS: CPT

## 2024-03-29 PROCEDURE — 96360 HYDRATION IV INFUSION INIT: CPT

## 2024-03-29 PROCEDURE — 99284 EMERGENCY DEPT VISIT MOD MDM: CPT | Performed by: EMERGENCY MEDICINE

## 2024-03-29 PROCEDURE — 84484 ASSAY OF TROPONIN QUANT: CPT

## 2024-03-29 PROCEDURE — 81025 URINE PREGNANCY TEST: CPT

## 2024-03-29 RX ORDER — IBUPROFEN 600 MG/1
600 TABLET ORAL ONCE
Status: COMPLETED | OUTPATIENT
Start: 2024-03-29 | End: 2024-03-29

## 2024-03-29 RX ORDER — ALPRAZOLAM 0.25 MG/1
0.25 TABLET ORAL ONCE
Status: COMPLETED | OUTPATIENT
Start: 2024-03-29 | End: 2024-03-29

## 2024-03-29 RX ADMIN — IBUPROFEN 600 MG: 600 TABLET ORAL at 10:01

## 2024-03-29 RX ADMIN — SODIUM CHLORIDE 1000 ML: 0.9 INJECTION, SOLUTION INTRAVENOUS at 09:07

## 2024-03-29 RX ADMIN — ALPRAZOLAM 0.25 MG: 0.25 TABLET ORAL at 10:31

## 2024-03-29 NOTE — Clinical Note
So Seymour was seen and treated in our emergency department on 3/29/2024.    No restrictions            Diagnosis:     So  may return to work on return date.    She may return on this date: 03/30/2024         If you have any questions or concerns, please don't hesitate to call.      Efe Arevalo MD    ______________________________           _______________          _______________  Hospital Representative                              Date                                Time

## 2024-03-29 NOTE — ED PROVIDER NOTES
History  Chief Complaint   Patient presents with    Dizziness     Patient reports around 0800, she felt a headache come on suddenly, felt like she was going to pass out, became dizzy, had one episode of diarrhea. Now reports SOB and some dizziness.     38 year old Female with no significant PMH who presents to the ED via ambulance for lightheadedness and diarrhea that started this morning. Patient states that she took first dose of Wegovy 0.25 mg yesterday afternoon. She woke up this morning fine but after a few hours she developed a headache along with a racing heart rate and lightheadedness. She states that her headache was over the left temporal area and was described as a pulsating sensation. She took tylenol at home with improvement in her symptoms however, was nervous and called EMS due to her lightheadedness. She also had a single episode of diarrhea this morning as well. No other new medication changes or other acute concerns at this time. Denies chest pain, SOB, cough, abdominal pain, n/v, fever, chills, dizziness, dysuria, hematuria, hematochezia, or melena.           Prior to Admission Medications   Prescriptions Last Dose Informant Patient Reported? Taking?   Etonogestrel (NEXPLANON SC)  Self Yes No   Sig: Inject under the skin   LORazepam (ATIVAN) 1 mg tablet  Self No No   Sig: Take 1 tablet 45 minutes prior to MRI and may take additional tablet 15 minutes prior to MRI as needed for anxiety   meloxicam (Mobic) 15 mg tablet   No No   Sig: Take 1 tablet (15 mg total) by mouth daily   metoclopramide (Reglan) 10 mg tablet   No No   Sig: Take 1 tablet (10 mg total) by mouth every 6 (six) hours as needed (For headache or nausea)   Patient not taking: Reported on 2/29/2024   naproxen (Naprosyn) 500 mg tablet   No No   Sig: Take 1 tablet (500 mg total) by mouth every 12 (twelve) hours as needed for mild pain or moderate pain (body aches)   Patient not taking: Reported on 2/29/2024   nortriptyline (PAMELOR) 10  mg capsule  Self No No   Sig: Take 1 capsule (10 mg total) by mouth daily at bedtime   Patient not taking: Reported on 2024   sertraline (ZOLOFT) 50 mg tablet   No No   Sig: Take 1.5 tablets (75 mg total) by mouth daily   tiZANidine (ZANAFLEX) 2 mg tablet  Self No No   Sig: Take 1 tablet (2 mg total) by mouth every 8 (eight) hours as needed for muscle spasms   Patient not taking: Reported on 2024      Facility-Administered Medications: None       Past Medical History:   Diagnosis Date    Anemia     20 years ago    Concussion     Migraine     Urinary tract infection     In the past     Varicella        Past Surgical History:   Procedure Laterality Date    FINGER SURGERY Left     [place]  DELIVERY ONLY N/A 2021    Procedure:  SECTION ();  Surgeon: Maria Esther Shen MD;  Location: [facility] ;  Service: Obstetrics    WISDOM TOOTH EXTRACTION Bilateral        Family History   Problem Relation Age of Onset    Hyperlipidemia Mother     Transient ischemic attack Father     Diabetes type II Father     Stroke Father         mini stroke    Dementia Maternal Grandmother     Heart attack Paternal Grandmother     No Known Problems Sister      I have reviewed and agree with the history as documented.    E-Cigarette/Vaping    E-Cigarette Use Never User      E-Cigarette/Vaping Substances    Nicotine No     THC No     CBD No     Flavoring No     Other No     Unknown No      Social History     Tobacco Use    Smoking status: Never    Smokeless tobacco: Never   Vaping Use    Vaping status: Never Used   Substance Use Topics    Alcohol use: Yes     Alcohol/week: 28.0 standard drinks of alcohol     Types: 28 Shots of liquor per week     Comment: 3-4 daily -- titos    Drug use: Never        Review of Systems   Constitutional:  Negative for appetite change, chills, diaphoresis, fatigue and fever.   HENT:  Negative for congestion, ear pain, postnasal drip, rhinorrhea, sore throat and trouble swallowing.     Eyes:  Negative for pain and visual disturbance.   Respiratory:  Negative for cough and shortness of breath.    Cardiovascular:  Negative for chest pain and palpitations.   Gastrointestinal:  Positive for diarrhea. Negative for abdominal pain, constipation, nausea and vomiting.   Genitourinary:  Negative for decreased urine volume, dysuria and hematuria.   Musculoskeletal:  Negative for arthralgias and back pain.   Skin:  Negative for color change and rash.   Neurological:  Positive for light-headedness and headaches. Negative for dizziness, seizures, syncope and weakness.   All other systems reviewed and are negative.      Physical Exam  ED Triage Vitals   Temperature Pulse Respirations Blood Pressure SpO2   03/29/24 0855 03/29/24 0852 03/29/24 0852 03/29/24 0852 03/29/24 0852   98.7 °F (37.1 °C) 91 18 131/88 97 %      Temp Source Heart Rate Source Patient Position - Orthostatic VS BP Location FiO2 (%)   03/29/24 0855 03/29/24 0852 03/29/24 0852 03/29/24 0852 --   Oral Monitor Sitting Right arm       Pain Score       03/29/24 1001       3             Orthostatic Vital Signs  Vitals:    03/29/24 0852   BP: 131/88   Pulse: 91   Patient Position - Orthostatic VS: Sitting       Physical Exam  Vitals and nursing note reviewed.   Constitutional:       Appearance: Normal appearance. She is normal weight.   HENT:      Head: Normocephalic and atraumatic.      Right Ear: External ear normal.      Left Ear: External ear normal.      Nose: Nose normal.      Mouth/Throat:      Pharynx: Oropharynx is clear.   Eyes:      Extraocular Movements: Extraocular movements intact.      Conjunctiva/sclera: Conjunctivae normal.      Pupils: Pupils are equal, round, and reactive to light.   Cardiovascular:      Rate and Rhythm: Normal rate and regular rhythm.      Pulses: Normal pulses.      Heart sounds: Normal heart sounds.      Comments: RRR with +S1 and S2, no murmurs appreciated on exam. Peripheral pulses intact.    Pulmonary:       Effort: Pulmonary effort is normal. No respiratory distress.      Breath sounds: Normal breath sounds. No wheezing, rhonchi or rales.      Comments: CTABL with no abnormal lung sounds such as wheezes or rales appreciated on exam.     Abdominal:      General: Abdomen is flat. Bowel sounds are normal. There is no distension.      Palpations: Abdomen is soft.      Tenderness: There is no abdominal tenderness.      Comments: Soft, non tender, normo-active bowel sounds. Without rigidity, guarding, or distension.     Musculoskeletal:         General: Normal range of motion.      Cervical back: Normal range of motion.   Skin:     General: Skin is warm and dry.   Neurological:      General: No focal deficit present.      Mental Status: She is alert and oriented to person, place, and time. Mental status is at baseline.      Comments: CN grossly intact on visualization. No focal neurologic deficits noted on exam.  5/5 strength in all extremities. Neurovascularly intact with normal sensation and motor function.             ED Medications  Medications   sodium chloride 0.9 % bolus 1,000 mL (0 mL Intravenous Stopped 3/29/24 1027)   ibuprofen (MOTRIN) tablet 600 mg (600 mg Oral Given 3/29/24 1001)   ALPRAZolam (XANAX) tablet 0.25 mg (0.25 mg Oral Given 3/29/24 1031)       Diagnostic Studies  Results Reviewed       Procedure Component Value Units Date/Time    POCT pregnancy, urine [[id number]]  (Normal) Resulted: 03/29/24 1005    Lab Status: Final result Updated: 03/29/24 1005     EXT Preg Test, Ur Negative     Control Valid    HS Troponin 0hr (reflex protocol) [[id number]]  (Normal) Collected: 03/29/24 0907    Lab Status: Final result Specimen: Blood from Arm, Left Updated: 03/29/24 0949     hs TnI 0hr <2 ng/L     Comprehensive metabolic panel [[id number]]  (Abnormal) Collected: 03/29/24 0907    Lab Status: Final result Specimen: Blood from Arm, Left Updated: 03/29/24 0947     Sodium 136 mmol/L      Potassium 3.5 mmol/L       Chloride 108 mmol/L      CO2 20 mmol/L      ANION GAP 8 mmol/L      BUN 12 mg/dL      Creatinine 0.76 mg/dL      Glucose 117 mg/dL      Calcium 9.5 mg/dL      AST 20 U/L      ALT 19 U/L      Alkaline Phosphatase 45 U/L      Total Protein 7.6 g/dL      Albumin 4.2 g/dL      Total Bilirubin 1.01 mg/dL      eGFR 100 ml/min/1.73sq m     Narrative:      National Kid[employer] Disease Foundation guidelines for Chronic Kidney Disease (CKD):     Stage 1 with normal or high GFR (GFR > 90 mL/min/1.73 square meters)    Stage 2 Mild CKD (GFR = 60-89 mL/min/1.73 square meters)    Stage 3A Moderate CKD (GFR = 45-59 mL/min/1.73 square meters)    Stage 3B Moderate CKD (GFR = 30-44 mL/min/1.73 square meters)    Stage 4 Severe CKD (GFR = 15-29 mL/min/1.73 square meters)    Stage 5 End Stage CKD (GFR <15 mL/min/1.73 square meters)  Note: GFR calculation is accurate only with a steady state creatinine    CBC and differential [[id number]] Collected: 03/29/24 0907    Lab Status: Final result Specimen: Blood from Arm, Left Updated: 03/29/24 0922     WBC 7.95 Thousand/uL      RBC 4.28 Million/uL      Hemoglobin 13.3 g/dL      Hematocrit 39.1 %      MCV 91 fL      MCH 31.1 pg      MCHC 34.0 g/dL      RDW 13.0 %      MPV 10.5 fL      Platelets 268 Thousands/uL      nRBC 0 /100 WBCs      Neutrophils Relative 56 %      Immature Grans % 0 %      Lymphocytes Relative 35 %      Monocytes Relative 5 %      Eosinophils Relative 3 %      Basophils Relative 1 %      Neutrophils Absolute 4.49 Thousands/µL      Absolute Immature Grans 0.02 Thousand/uL      Absolute Lymphocytes 2.77 Thousands/µL      Absolute Monocytes 0.39 Thousand/µL      Eosinophils Absolute 0.24 Thousand/µL      Basophils Absolute 0.04 Thousands/µL                    XR chest 2 views   ED Interpretation by Efe Arevalo MD (03/29 0940)   Image was independently interpreted by myself and showed no acute concerns of cardiopulmonary disease at this time.        Final Result by Marbin Han,  MD (03/29 1116)      No focal consolidation, pleural effusion, or pneumothorax.            Workstation performed: [id number]               Procedures  ECG 12 Lead Documentation Only    Date/Time: 3/29/2024 9:00 AM    Performed by: Efe Arevalo MD  Authorized by: Efe Arevalo MD    ECG reviewed by me, the ED Provider: yes    Patient location:  ED  Previous ECG:     Previous ECG:  Compared to current    Similarity:  No change  Interpretation:     Interpretation: normal    Rate:     ECG rate assessment: normal    Rhythm:     Rhythm: sinus rhythm    Ectopy:     Ectopy: none    QRS:     QRS axis:  Normal    QRS intervals:  Normal  Conduction:     Conduction: normal    ST segments:     ST segments:  Normal  T waves:     T waves: normal          ED Course  ED Course as of 03/29/24 1426   Fri Mar 29, 2024   0940 XR chest 2 views  Image was independently interpreted by myself and showed no acute concerns of cardiopulmonary disease at this time.     1010 POCT pregnancy, urine  Negative at this time   1010 No acute concerns on lab work at this time.   1028 Given dose of Xanax prior to discharge for mild anxiety.                                       Medical Decision Making  38-year-old female with no significant PMH who presented to the ED via ambulance for lightheadedness and diarrhea that started this morning.  Patient's labs and imaging was obtained and reviewed by the ED provider.  See ED course for more details about patient's ED stay.  Patient's 2 view chest x-ray along with her lab work showed no acute concerns at this time.  Patient's EKG showed normal sinus rhythm and no change from previous EKGs noted.  While in the emergency department, patient was given 1 L bolus normal saline along with 600 mg of Motrin for pain control.  Prior to discharge, patient was also given 0.25 mg of Xanax for her anxiety.  Through shared decision making to the patient and provider, the patient was planned for discharge.  Patient was advised  to follow-up outpatient with her PCP as well as gastroenterology and to continue her medications as prescribed.  Patient was also instructed to return to the ED if her symptoms worsen including but not limited to worsening lightheadedness, fever, chills, nausea or vomiting, chest pain, shortness of breath, or changes in her behavior.    Amount and/or Complexity of Data Reviewed  Labs: ordered. Decision-making details documented in ED Course.  Radiology: ordered and independent interpretation performed. Decision-making details documented in ED Course.    Risk  Prescription drug management.          Disposition  Final diagnoses:   Lightheadedness   Headache     Time reflects when diagnosis was documented in both MDM as applicable and the Disposition within this note       Time User Action Codes Description Comment    3/29/2024 10:11 AM Efe Arevalo Add [R42] Lightheadedness     3/29/2024 10:11 AM Efe Arevalo [R51.9] Headache           ED Disposition       ED Disposition   Discharge    Condition   Stable    Date/Time   Fri Mar 29, 2024 10:16 AM    Comment   So Seymour discharge to home/self care.                   Follow-up Information       Follow up With Specialties Details Why Contact Info Additional Information    WILFREDO Paniagua, DO Family Medicine Call  As needed [address] [facility] [address] [place]  [address]  [place] [address]  [phone]       [facility] Emergency Medicine Go to  As needed [address] [facility] [id number] [phone] [facility], [address] [place], [place], [address]    [facility] Call  As needed [address]  [address]  [facility] [address] [phone] [facility], [address], [place], [place], [address] [phone]            Discharge Medication List as of  3/29/2024 10:17 AM        CONTINUE these medications which have NOT CHANGED    Details   Etonogestrel (NEXPLANON SC) Inject under the skin, Historical Med      LORazepam (ATIVAN) 1 mg tablet Take 1 tablet 45 minutes prior to MRI and may take additional tablet 15 minutes prior to MRI as needed for anxiety, Normal      meloxicam (Mobic) 15 mg tablet Take 1 tablet (15 mg total) by mouth daily, Starting Thu 9/14/2023, Until Mon 11/6/2023, Normal      metoclopramide (Reglan) 10 mg tablet Take 1 tablet (10 mg total) by mouth every 6 (six) hours as needed (For headache or nausea), Starting Mon 2/12/2024, Normal      naproxen (Naprosyn) 500 mg tablet Take 1 tablet (500 mg total) by mouth every 12 (twelve) hours as needed for mild pain or moderate pain (body aches), Starting Mon 2/12/2024, Normal      nortriptyline (PAMELOR) 10 mg capsule Take 1 capsule (10 mg total) by mouth daily at bedtime, Starting Tue 3/14/2023, Normal      sertraline (ZOLOFT) 50 mg tablet Take 1.5 tablets (75 mg total) by mouth daily, Starting Mon 11/6/2023, Normal      tiZANidine (ZANAFLEX) 2 mg tablet Take 1 tablet (2 mg total) by mouth every 8 (eight) hours as needed for muscle spasms, Starting Fri 9/29/2023, Normal           No discharge procedures on file.    PDMP Review         Value Time User    PDMP Reviewed  Yes 9/29/2023 12:19 PM David Smith DO             ED Provider  Attending physically available and evaluated So Seymour. I managed the patient along with the ED Attending.    Electronically Signed by           Efe Arevalo MD  03/29/24 5211

## 2024-03-31 LAB
ATRIAL RATE: 77 BPM
P AXIS: 58 DEGREES
PR INTERVAL: 136 MS
QRS AXIS: 43 DEGREES
QRSD INTERVAL: 76 MS
QT INTERVAL: 390 MS
QTC INTERVAL: 441 MS
T WAVE AXIS: 32 DEGREES
VENTRICULAR RATE: 77 BPM

## 2024-03-31 PROCEDURE — 93010 ELECTROCARDIOGRAM REPORT: CPT | Performed by: INTERNAL MEDICINE

## 2024-05-08 DIAGNOSIS — F41.9 ANXIETY: ICD-10-CM

## 2024-05-14 ENCOUNTER — PROCEDURE VISIT (OUTPATIENT)
Dept: OBGYN CLINIC | Facility: CLINIC | Age: 39
End: 2024-05-14
Payer: COMMERCIAL

## 2024-05-14 VITALS — BODY MASS INDEX: 30.59 KG/M2 | DIASTOLIC BLOOD PRESSURE: 72 MMHG | SYSTOLIC BLOOD PRESSURE: 110 MMHG | WEIGHT: 192.4 LBS

## 2024-05-14 DIAGNOSIS — Z30.46 ENCOUNTER FOR REMOVAL AND REINSERTION OF NEXPLANON: Primary | ICD-10-CM

## 2024-05-14 PROCEDURE — 11983 REMOVE/INSERT DRUG IMPLANT: CPT | Performed by: OBSTETRICS & GYNECOLOGY

## 2024-05-14 NOTE — PROGRESS NOTES
Universal Protocol:  Consent: Written consent obtained.  Risks and benefits: risks, benefits and alternatives were discussed (bleeding, bruising, infection, device migration, need for surgical removal with risk to muscle/nerves/vessels)  Consent given by: patient  Remove and insert drug implant    Date/Time: 5/14/2024 9:00 AM    Performed by: Dotty Martin MD  Authorized by: Dotty Martin MD    Indication:     Indication: Insertion of non-biodegradable drug delivery implant    Pre-procedure:     Prepped with: povidone-iodine      Local anesthetic:  Lidocaine with epinephrine (2%)    The site was cleaned and prepped in a sterile fashion: yes    Procedure:     Procedure:  Removal with reinsertion    Small stab incision was made in arm: yes      Left/right:  Right    Preloaded contraceptive capsule trocar was placed subdermally: yes      Visualization of implant was obtained: yes      Contraceptive capsule was inserted and trocar removed: yes      Visualization of notch in stylet and palpation of device: yes      Palpation confirms placement by provider and patient: yes      Site was closed with steri-strips and pressure bandage applied: yes    Comments:      After stab incision was made, previously placed device was removed using a mosquito.  The new device was then placed as above.

## 2024-12-14 DIAGNOSIS — F41.9 ANXIETY: ICD-10-CM

## 2025-01-08 ENCOUNTER — TELEPHONE (OUTPATIENT)
Dept: FAMILY MEDICINE CLINIC | Facility: CLINIC | Age: 40
End: 2025-01-08

## 2025-03-13 ENCOUNTER — ANNUAL EXAM (OUTPATIENT)
Dept: OBGYN CLINIC | Facility: CLINIC | Age: 40
End: 2025-03-13
Payer: COMMERCIAL

## 2025-03-13 VITALS
BODY MASS INDEX: 26.56 KG/M2 | WEIGHT: 169.2 LBS | SYSTOLIC BLOOD PRESSURE: 110 MMHG | HEIGHT: 67 IN | DIASTOLIC BLOOD PRESSURE: 74 MMHG

## 2025-03-13 DIAGNOSIS — Z01.419 ENCNTR FOR GYN EXAM (GENERAL) (ROUTINE) W/O ABN FINDINGS: Primary | ICD-10-CM

## 2025-03-13 PROBLEM — J02.9 SORE THROAT: Status: RESOLVED | Noted: 2022-06-14 | Resolved: 2025-03-13

## 2025-03-13 PROBLEM — H92.09 EARACHE: Status: RESOLVED | Noted: 2022-06-14 | Resolved: 2025-03-13

## 2025-03-13 PROBLEM — R19.7 DIARRHEA: Status: RESOLVED | Noted: 2022-03-09 | Resolved: 2025-03-13

## 2025-03-13 PROBLEM — Z98.891 S/P PRIMARY LOW TRANSVERSE C-SECTION: Status: RESOLVED | Noted: 2021-05-26 | Resolved: 2025-03-13

## 2025-03-13 PROBLEM — Z79.899 ENCOUNTER FOR LONG-TERM (CURRENT) USE OF MEDICATIONS: Status: RESOLVED | Noted: 2023-11-06 | Resolved: 2025-03-13

## 2025-03-13 PROBLEM — B34.9 VIRAL INFECTION, UNSPECIFIED: Status: RESOLVED | Noted: 2021-12-16 | Resolved: 2025-03-13

## 2025-03-13 PROBLEM — Z71.2 ENCOUNTER TO DISCUSS TEST RESULTS: Status: RESOLVED | Noted: 2023-11-06 | Resolved: 2025-03-13

## 2025-03-13 PROBLEM — R63.5 WEIGHT GAIN: Status: RESOLVED | Noted: 2021-10-20 | Resolved: 2025-03-13

## 2025-03-13 PROBLEM — M54.2 NECK PAIN: Status: RESOLVED | Noted: 2023-09-29 | Resolved: 2025-03-13

## 2025-03-13 PROBLEM — Z79.899 MEDICATION MANAGEMENT: Status: RESOLVED | Noted: 2023-11-06 | Resolved: 2025-03-13

## 2025-03-13 PROCEDURE — S0612 ANNUAL GYNECOLOGICAL EXAMINA: HCPCS | Performed by: OBSTETRICS & GYNECOLOGY

## 2025-03-13 PROCEDURE — G0476 HPV COMBO ASSAY CA SCREEN: HCPCS | Performed by: OBSTETRICS & GYNECOLOGY

## 2025-03-13 PROCEDURE — G0145 SCR C/V CYTO,THINLAYER,RESCR: HCPCS | Performed by: OBSTETRICS & GYNECOLOGY

## 2025-03-13 NOTE — PROGRESS NOTES
So Seymour  1985      CC:  Yearly exam    S:  39 y.o. female here for yearly exam. Her cycles are absent on the Nexplanon.     Sexual activity: She is sexually active without pain, bleeding or dryness.     Contraception: She uses Nexplanon for contraception. (Placed 5/14/24).  She feels it is palpable in her arm.      STD testing:  She does not want STD testing today.     Last Pap 11/5/2020 - normal/negative HPV  Last Mammo - never    We reviewed ASCCP guidelines for Pap testing today.       Current Outpatient Medications:     Etonogestrel (NEXPLANON SC), Inject under the skin, Disp: , Rfl:     nortriptyline (PAMELOR) 10 mg capsule, Take 1 capsule (10 mg total) by mouth daily at bedtime, Disp: 30 capsule, Rfl: 4    sertraline (ZOLOFT) 50 mg tablet, TAKE 1 AND 1/2 TABLETS DAILY BY MOUTH, Disp: 135 tablet, Rfl: 0    LORazepam (ATIVAN) 1 mg tablet, Take 1 tablet 45 minutes prior to MRI and may take additional tablet 15 minutes prior to MRI as needed for anxiety (Patient not taking: Reported on 3/13/2025), Disp: 2 tablet, Rfl: 0    metoclopramide (Reglan) 10 mg tablet, Take 1 tablet (10 mg total) by mouth every 6 (six) hours as needed (For headache or nausea) (Patient not taking: Reported on 3/13/2025), Disp: 30 tablet, Rfl: 0    naproxen (Naprosyn) 500 mg tablet, Take 1 tablet (500 mg total) by mouth every 12 (twelve) hours as needed for mild pain or moderate pain (body aches) (Patient not taking: Reported on 3/13/2025), Disp: 30 tablet, Rfl: 0    tiZANidine (ZANAFLEX) 2 mg tablet, Take 1 tablet (2 mg total) by mouth every 8 (eight) hours as needed for muscle spasms (Patient not taking: Reported on 2/29/2024), Disp: 90 tablet, Rfl: 1    Current Facility-Administered Medications:     etonogestrel (NEXPLANON) subdermal implant 68 mg, 68 mg, Subdermal, Once every 3 years, , 68 mg at 05/14/24 0921  Social History     Socioeconomic History    Marital status: /Civil Union     Spouse name: Not on file    Number  "of children: Not on file    Years of education: Not on file    Highest education level: Not on file   Occupational History    Not on file   Tobacco Use    Smoking status: Never    Smokeless tobacco: Never   Vaping Use    Vaping status: Never Used   Substance and Sexual Activity    Alcohol use: Yes     Alcohol/week: 28.0 standard drinks of alcohol     Types: 28 Shots of liquor per week     Comment: 3-4 daily -- titos    Drug use: Never    Sexual activity: Yes     Partners: Male     Birth control/protection: Implant   Other Topics Concern    Not on file   Social History Narrative    Not on file     Social Drivers of Health     Financial Resource Strain: Not on file   Food Insecurity: Not on file   Transportation Needs: Not on file   Physical Activity: Not on file   Stress: Not on file   Social Connections: Unknown (11/19/2020)    Received from [facility], [facility]    Social Isolation     How often do you see or talk to people that you care about and feel close to?: Not on file   Intimate Partner Violence: Not on file   Housing Stability: Not on file     Family History   Problem Relation Age of Onset    Hyperlipidemia Mother     Transient ischemic attack Father     Diabetes type II Father     Stroke Father         mini stroke    Dementia Maternal Grandmother     Heart attack Paternal Grandmother     No Known Problems Sister       Past Medical History:   Diagnosis Date    Anemia     20 years ago    Concussion     Migraine     Urinary tract infection     In the past     Varicella         Review of Systems   Respiratory: Negative.    Cardiovascular: Negative.    Gastrointestinal: Negative for constipation and diarrhea.   Genitourinary: Negative for difficulty urinating, pelvic pain, vaginal bleeding, vaginal discharge, itching or odor.    O:  Blood pressure 110/74, height 5' 7\" (1.702 m), weight 76.7 kg (169 lb 3.2 oz), not currently breastfeeding.    Patient appears well and is not in distress  Neck is " supple without masses  Breasts are symmetrical without mass, tenderness, nipple discharge, skin changes or adenopathy.   Nexplanon palpable in right upper extremity  Abdomen is soft and nontender without masses.   External genitals are normal without lesions or rashes.  Urethral meatus and urethra are normal  Bladder is normal to palpation  Vagina is normal without discharge or bleeding.   Cervix is normal without discharge or lesion.   Uterus is normal, mobile, nontender without palpable mass.  Adnexa are normal, nontender, without palpable mass.     A:   Yearly exam.     P:   Pap with HPV done - will call with results   Mammo at age 41    RTO one year for yearly exam or sooner as needed.      75-year-old male past medical history of migraine headaches presents to the ED for fourth time reporting headaches.  Intractable.  Not tolerated at home.  Patient has been taking medications such as rizatriptan, Benadryl, Excedrin with no relief.  Patient has had CAT scans with and without contrast that showed no acute findings. Son returns his father to the ED today out of fear that he does not want his father home any longer.  Patient sustained a fall yesterday and went to [facility].  He had a laceration that was repaired (ear).  Completed the CTs (CT and CTA) that were negative.  However since patient is having such severe headaches it is causing syncopal episodes with fall and trauma, patient's son is concerned for his safety.    They recommended requested to see neurology while at [place] however did not receive such consultation.  No fever or vomiting.  Patient has a history of success with Nurtec ODT however was told that there are limited amount of time that he can take it and he has exhausted those limits.  His primary neurologist is Dr. Eliane Burnett and Noemi Montano.  Next appointment is at the end of January.    See MRN [id number] (pt had chart at [facility] unlinked to current active chart).    Exam as stated. Will check labs and place Neuro consult. Plan for admission. 75-year-old male past medical history of migraine headaches presents to the ED for fourth time reporting headaches.  Intractable.  Not tolerated at home.  Patient has been taking medications such as rizatriptan, Benadryl, Excedrin with no relief.  Patient has had CAT scans with and without contrast that showed no acute findings. Son returns his father to the ED today out of fear that he does not want his father home any longer.  Patient sustained a fall yesterday and went to [facility].  He had a laceration that was repaired (ear).  Completed the CTs (CT and CTA) that were negative.  However since patient is having such severe headaches it is causing syncopal episodes with fall and trauma, patient's son is concerned for his safety.    They recommended requested to see neurology while at [place] however did not receive such consultation.  No fever or vomiting.  Patient has a history of success with Nurtec ODT however was told that there are limited amount of time that he can take it and he has exhausted those limits.  His primary neurologist is Dr. Eliane Burnett and Noemi Montano.  Next appointment is at the end of January.    See MRN [id number] (pt had chart at [facility] unlinked to current active chart).    Exam as stated. Will check labs and place Neuro consult. Plan for admission. 75-year-old male past medical history of migraine headaches presents to the ED for fourth time reporting headaches.  Intractable.  Not tolerated at home.  Patient has been taking medications such as rizatriptan, Benadryl, Excedrin with no relief.  Patient has had CAT scans with and without contrast that showed no acute findings. Son returns his father to the ED today out of fear that he does not want his father home any longer.  Patient sustained a fall yesterday and went to [facility].  He had a laceration that was repaired (ear).  Completed the CTs (CT and CTA) that were negative.  However since patient is having such severe headaches it is causing syncopal episodes with fall and trauma, patient's son is concerned for his safety.    They recommended requested to see neurology while at [place] however did not receive such consultation.  No fever or vomiting.  Patient has a history of success with Nurtec ODT however was told that there are limited amount of time that he can take it and he has exhausted those limits.  His primary neurologist is Dr. Eliane Burnett and Noemi Montano.  Next appointment is at the end of January.    See MRN [id number] (pt had chart at [facility] unlinked to current active chart).    Exam as stated. Will check labs and place Neuro consult. Plan for admission.    Consulted with Attending Neurologist Dr DEMOND Berg. Admitted to Attending Hospitalist Dr Mesa. 75-year-old male past medical history of migraine headaches presents to the ED for fourth time reporting headaches.  Intractable.  Not tolerated at home.  Patient has been taking medications such as rizatriptan, Benadryl, Excedrin with no relief.  Patient has had CAT scans with and without contrast that showed no acute findings. Son returns his father to the ED today out of fear that he does not want his father home any longer.  Patient sustained a fall yesterday and went to [facility].  He had a laceration that was repaired (ear).  Completed the CTs (CT and CTA) that were negative.  However since patient is having such severe headaches it is causing syncopal episodes with fall and trauma, patient's son is concerned for his safety.    They recommended requested to see neurology while at [place] however did not receive such consultation.  No fever or vomiting.  Patient has a history of success with Nurtec ODT however was told that there are limited amount of time that he can take it and he has exhausted those limits.  His primary neurologist is Dr. Eliane Burnett and Noemi Montano.  Next appointment is at the end of January.    See MRN [id number] (pt had chart at [facility] unlinked to current active chart).    Exam as stated. Will check labs and place Neuro consult. Plan for admission.    Consulted with Attending Neurologist Dr DEMOND Berg. Admitted to Attending Hospitalist Dr Mesa.

## 2025-03-17 LAB
HPV HR 12 DNA CVX QL NAA+PROBE: NEGATIVE
HPV16 DNA CVX QL NAA+PROBE: NEGATIVE
HPV18 DNA CVX QL NAA+PROBE: NEGATIVE

## 2025-03-19 ENCOUNTER — RESULTS FOLLOW-UP (OUTPATIENT)
Dept: OBGYN CLINIC | Facility: CLINIC | Age: 40
End: 2025-03-19

## 2025-03-19 LAB
LAB AP GYN PRIMARY INTERPRETATION: NORMAL
Lab: NORMAL

## 2025-03-25 NOTE — TELEPHONE ENCOUNTER
Left VM for patient (per communication consent) to advise patient that her pap smear results were normal/neg. Advised patient that she can view results on [employer] and if she has any other questions or concerns, please call back.

## 2025-03-25 NOTE — TELEPHONE ENCOUNTER
----- Message from Dotty Martin MD sent at 3/25/2025  5:26 PM EDT -----  Please call the patient to inform her of the information below.  She did not check her MyChart.    Thanks!!

## 2025-04-23 ENCOUNTER — TELEPHONE (OUTPATIENT)
Dept: FAMILY MEDICINE CLINIC | Facility: CLINIC | Age: 40
End: 2025-04-23

## 2025-04-23 NOTE — TELEPHONE ENCOUNTER
Please assist in updating chart as pt is no longer f/u with Dr. Paniagua. Pt established care with:     Yenifer Almeida, [facility] at [address]   [address]   [place] [address] [phone] (Work)   [phone] (Fax)

## 2025-04-28 NOTE — TELEPHONE ENCOUNTER
04/28/25 8:10 AM        The office's request has been received, reviewed, and the patient chart updated. The PCP has successfully been removed with a patient attribution note. This message will now be completed.        Thank you  David Shell

## 2025-05-25 ENCOUNTER — APPOINTMENT (EMERGENCY)
Dept: RADIOLOGY | Facility: HOSPITAL | Age: 40
End: 2025-05-25
Payer: COMMERCIAL

## 2025-05-25 ENCOUNTER — HOSPITAL ENCOUNTER (EMERGENCY)
Facility: HOSPITAL | Age: 40
Discharge: HOME | End: 2025-05-25
Attending: EMERGENCY MEDICINE | Admitting: EMERGENCY MEDICINE
Payer: COMMERCIAL

## 2025-05-25 VITALS
SYSTOLIC BLOOD PRESSURE: 118 MMHG | HEART RATE: 81 BPM | DIASTOLIC BLOOD PRESSURE: 82 MMHG | TEMPERATURE: 98 F | RESPIRATION RATE: 16 BRPM | OXYGEN SATURATION: 97 %

## 2025-05-25 DIAGNOSIS — R07.9 CHEST PAIN: Primary | ICD-10-CM

## 2025-05-25 LAB
ALBUMIN SERPL BCG-MCNC: 4.5 G/DL (ref 3.5–5)
ALP SERPL-CCNC: 45 U/L (ref 34–104)
ALT SERPL W P-5'-P-CCNC: 20 U/L (ref 7–52)
ANION GAP SERPL CALCULATED.3IONS-SCNC: 9 MMOL/L (ref 4–13)
AST SERPL W P-5'-P-CCNC: 17 U/L (ref 13–39)
ATRIAL RATE: 104 BPM
BASOPHILS # BLD AUTO: 0.03 THOUSANDS/ÂΜL (ref 0–0.1)
BASOPHILS NFR BLD AUTO: 0 % (ref 0–1)
BILIRUB SERPL-MCNC: 0.56 MG/DL (ref 0.2–1)
BUN SERPL-MCNC: 11 MG/DL (ref 5–25)
CALCIUM SERPL-MCNC: 9.6 MG/DL (ref 8.4–10.2)
CARDIAC TROPONIN I PNL SERPL HS: <2 NG/L (ref ?–50)
CARDIAC TROPONIN I PNL SERPL HS: <2 NG/L (ref ?–50)
CHLORIDE SERPL-SCNC: 106 MMOL/L (ref 96–108)
CO2 SERPL-SCNC: 24 MMOL/L (ref 21–32)
CREAT SERPL-MCNC: 0.78 MG/DL (ref 0.6–1.3)
D DIMER PPP FEU-MCNC: <0.27 UG/ML FEU
EOSINOPHIL # BLD AUTO: 0.19 THOUSAND/ÂΜL (ref 0–0.61)
EOSINOPHIL NFR BLD AUTO: 3 % (ref 0–6)
ERYTHROCYTE [DISTWIDTH] IN BLOOD BY AUTOMATED COUNT: 12.9 % (ref 11.6–15.1)
EXT PREGNANCY TEST URINE: NEGATIVE
EXT. CONTROL: NORMAL
GFR SERPL CREATININE-BSD FRML MDRD: 96 ML/MIN/1.73SQ M
GLUCOSE SERPL-MCNC: 94 MG/DL (ref 65–140)
HCT VFR BLD AUTO: 43.7 % (ref 34.8–46.1)
HGB BLD-MCNC: 15 G/DL (ref 11.5–15.4)
IMM GRANULOCYTES # BLD AUTO: 0.02 THOUSAND/UL (ref 0–0.2)
IMM GRANULOCYTES NFR BLD AUTO: 0 % (ref 0–2)
LIPASE SERPL-CCNC: 48 U/L (ref 11–82)
LYMPHOCYTES # BLD AUTO: 3 THOUSANDS/ÂΜL (ref 0.6–4.47)
LYMPHOCYTES NFR BLD AUTO: 44 % (ref 14–44)
MCH RBC QN AUTO: 32.2 PG (ref 26.8–34.3)
MCHC RBC AUTO-ENTMCNC: 34.3 G/DL (ref 31.4–37.4)
MCV RBC AUTO: 94 FL (ref 82–98)
MONOCYTES # BLD AUTO: 0.39 THOUSAND/ÂΜL (ref 0.17–1.22)
MONOCYTES NFR BLD AUTO: 6 % (ref 4–12)
NEUTROPHILS # BLD AUTO: 3.18 THOUSANDS/ÂΜL (ref 1.85–7.62)
NEUTS SEG NFR BLD AUTO: 47 % (ref 43–75)
NRBC BLD AUTO-RTO: 0 /100 WBCS
P AXIS: 77 DEGREES
PLATELET # BLD AUTO: 237 THOUSANDS/UL (ref 149–390)
PMV BLD AUTO: 10.4 FL (ref 8.9–12.7)
POTASSIUM SERPL-SCNC: 4.1 MMOL/L (ref 3.5–5.3)
PR INTERVAL: 128 MS
PROT SERPL-MCNC: 7.7 G/DL (ref 6.4–8.4)
QRS AXIS: 79 DEGREES
QRSD INTERVAL: 72 MS
QT INTERVAL: 362 MS
QTC INTERVAL: 476 MS
RBC # BLD AUTO: 4.66 MILLION/UL (ref 3.81–5.12)
SODIUM SERPL-SCNC: 139 MMOL/L (ref 135–147)
T WAVE AXIS: 64 DEGREES
VENTRICULAR RATE: 104 BPM
WBC # BLD AUTO: 6.81 THOUSAND/UL (ref 4.31–10.16)

## 2025-05-25 PROCEDURE — 84484 ASSAY OF TROPONIN QUANT: CPT

## 2025-05-25 PROCEDURE — 96375 TX/PRO/DX INJ NEW DRUG ADDON: CPT

## 2025-05-25 PROCEDURE — 99285 EMERGENCY DEPT VISIT HI MDM: CPT | Performed by: EMERGENCY MEDICINE

## 2025-05-25 PROCEDURE — 36415 COLL VENOUS BLD VENIPUNCTURE: CPT

## 2025-05-25 PROCEDURE — 93005 ELECTROCARDIOGRAM TRACING: CPT

## 2025-05-25 PROCEDURE — 80053 COMPREHEN METABOLIC PANEL: CPT

## 2025-05-25 PROCEDURE — 81025 URINE PREGNANCY TEST: CPT

## 2025-05-25 PROCEDURE — 93010 ELECTROCARDIOGRAM REPORT: CPT | Performed by: INTERNAL MEDICINE

## 2025-05-25 PROCEDURE — 99285 EMERGENCY DEPT VISIT HI MDM: CPT

## 2025-05-25 PROCEDURE — 85379 FIBRIN DEGRADATION QUANT: CPT

## 2025-05-25 PROCEDURE — 96365 THER/PROPH/DIAG IV INF INIT: CPT

## 2025-05-25 PROCEDURE — 85025 COMPLETE CBC W/AUTO DIFF WBC: CPT

## 2025-05-25 PROCEDURE — 83690 ASSAY OF LIPASE: CPT

## 2025-05-25 PROCEDURE — 71046 X-RAY EXAM CHEST 2 VIEWS: CPT

## 2025-05-25 RX ORDER — FAMOTIDINE 10 MG/ML
20 INJECTION, SOLUTION INTRAVENOUS ONCE
Status: COMPLETED | OUTPATIENT
Start: 2025-05-25 | End: 2025-05-25

## 2025-05-25 RX ORDER — PANTOPRAZOLE SODIUM 40 MG/10ML
40 INJECTION, POWDER, LYOPHILIZED, FOR SOLUTION INTRAVENOUS ONCE
Status: COMPLETED | OUTPATIENT
Start: 2025-05-25 | End: 2025-05-25

## 2025-05-25 RX ORDER — KETOROLAC TROMETHAMINE 30 MG/ML
15 INJECTION, SOLUTION INTRAMUSCULAR; INTRAVENOUS ONCE
Status: COMPLETED | OUTPATIENT
Start: 2025-05-25 | End: 2025-05-25

## 2025-05-25 RX ORDER — ACETAMINOPHEN 10 MG/ML
1000 INJECTION, SOLUTION INTRAVENOUS ONCE
Status: COMPLETED | OUTPATIENT
Start: 2025-05-25 | End: 2025-05-25

## 2025-05-25 RX ADMIN — PANTOPRAZOLE SODIUM 40 MG: 40 INJECTION, POWDER, LYOPHILIZED, FOR SOLUTION INTRAVENOUS at 08:57

## 2025-05-25 RX ADMIN — KETOROLAC TROMETHAMINE 15 MG: 30 INJECTION, SOLUTION INTRAMUSCULAR; INTRAVENOUS at 11:45

## 2025-05-25 RX ADMIN — ACETAMINOPHEN 1000 MG: 10 INJECTION INTRAVENOUS at 09:00

## 2025-05-25 RX ADMIN — FAMOTIDINE 20 MG: 10 INJECTION INTRAVENOUS at 08:56

## 2025-05-25 NOTE — DISCHARGE INSTRUCTIONS
You were evaluated in the emergency department for: chest pain. You can access your current and pending results through [employer]. A radiologist will take a second look at your X-Rays, if you had any, and you will be contacted with any new findings.    - You should follow-up with your primary care provider, as soon as possible, for re-evaluation.    Please, return to the emergency department if you experience new or worsening symptoms, fever, chest pain, shortness of breath, difficulty breathing, dizziness, abdominal pain, persistent nausea/vomiting, syncope or passing out, blood in your urine or stool, coughing up blood, leg swelling/pain, urinary retention, bowel or bladder incontinence, numbness between your legs.

## 2025-05-25 NOTE — ED ATTENDING ATTESTATION
5/25/2025  I, Juan Nicholson MD, saw and evaluated the patient. I have discussed the patient with the resident/non-physician practitioner and agree with the resident's/non-physician practitioner's findings, Plan of Care, and MDM as documented in the resident's/non-physician practitioner's note, except where noted. All available labs and Radiology studies were reviewed.  I was present for key portions of any procedure(s) performed by the resident/non-physician practitioner and I was immediately available to provide assistance.       At this point I agree with the current assessment done in the Emergency Department.  I have conducted an independent evaluation of this patient a history and physical is as follows:  Briefly, 39-year-old female presenting with lightheaded dizziness-year-old chest pain.  Patient states that she is in normal state of health yesterday, this morning woke with pain in the right side of the chest.  Pain is in the right upper chest, radiating towards the shoulder, worse with moving the arm of the shoulder, unchanged with walking or exertion, better without moving the arm.  She denies nausea, vomiting, fever, chest pain, shortness of breath, trauma, or any clear provoking incident or other symptoms.  On examination, heart sounds normal, lungs clear to auscultation, abdomen nontender, negative Roberts sign, no rebound or guarding.  Pain is reproduced with abduction as well as extension of the right arm.  No erythema or deformity noted.  Strength sensation pulse and cap refill intact throughout both upper extremities.  EKG not acutely ischemic, labs overall reassuring with negative troponins, negative D-dimer.  Do not suspect ACS, PE, aortic dissection, bacterial pneumonia, pneumothorax, cholecystitis, other acute life threat.  Discharged with strict return precautions, follow-up with primary care doctor.  ED Course         Critical Care Time  Procedures

## 2025-05-25 NOTE — ED PROVIDER NOTES
Time reflects when diagnosis was documented in both MDM as applicable and the Disposition within this note       Time User Action Codes Description Comment    5/25/2025 11:10 AM Ruiz Farrarvieve Add [R07.9] Chest pain           ED Disposition       ED Disposition   Discharge    Condition   Stable    Date/Time   Sun May 25, 2025 11:47 AM    Comment   So Seymour discharge to home/self care.                   Assessment & Plan       Medical Decision Making  So Seymour is a 39 y.o. female presenting with non-pleuritic, non-exertional chest pain. Vitals remarkable for initial tachycardia. Exam remarkable for mild abdominal tenderness without rebound or distention. Pain with ROM of right shoulder.      DDX including but not limited to: chest wall pain, pleurisy, costochondritis, pericarditis, myocarditis, PTX, pneumonia, GI etiology; doubt ACS or MI or PE or rhabdomyolysis.    See ED course for further updates and interpretation of results.    Based on these results and H&P, unclear exact etiology of patient's symptoms. Lab work and imaging without signs of acute cardiopulmonary disease, no sign of ACS or PE. Patient reports limited relief of pain after medication. Discussed alternative treatments, however after shared decision making, patient to be discharged. Stable for discharge.    Results, clinical impressions, and plan were discussed with patient. They expressed understanding and were in agreement with plan. Patient was given the opportunity to ask questions in ED. All questions and concerns were addressed in ED.    After evaluation and workup in the emergency department Patient appears well, is nontoxic appearing, expresses understanding and agrees with plan of care at this time.  In light of this patient would benefit from outpatient management. Discussed strict return precautions.  Discussed importance of following up with PCP in the next few days.  All questions answered.  Patient is agreeable to  discharge.    Amount and/or Complexity of Data Reviewed  External Data Reviewed: labs, radiology, ECG and notes.  Labs: ordered. Decision-making details documented in ED Course.  Radiology: independent interpretation performed.  ECG/medicine tests:  Decision-making details documented in ED Course.    Risk  OTC drugs.  Prescription drug management.        ED Course as of 05/26/25 0650   Sun May 25, 2025   0825 ECG 12 lead  No STEMI   0834 CBC and differential  Within normal limits   0852 hs TnI 0hr: <2  Will need 2 hour as symptoms start at 6 am   0852 Comprehensive metabolic panel  Within normal limits   0922 PREGNANCY TEST URINE: Negative   0936 D-Dimer, Quant: <0.27  Within normal limits   0944 Pulse: 81  Improved without direct intervention   1056 hs TnI 2hr: <2  Within normal limits, unlikely to be ACS at this   1129 Discussed all results. Patient reports no improvement of pain. Will try toradol   1129 Lipase   1147 LIPASE: 48  Within normal limits       Medications   pantoprazole (PROTONIX) injection 40 mg (40 mg Intravenous Given 5/25/25 0857)   Famotidine (PF) (PEPCID) injection 20 mg (20 mg Intravenous Given 5/25/25 0856)   acetaminophen (Ofirmev) injection 1,000 mg (0 mg Intravenous Stopped 5/25/25 0921)   ketorolac (TORADOL) injection 15 mg (15 mg Intravenous Given 5/25/25 1145)       ED Risk Strat Scores   HEART Risk Score      Flowsheet Row Most Recent Value   Heart Score Risk Calculator    History 1 Filed at: 05/25/2025 0852   ECG 0 Filed at: 05/25/2025 0852   Age 0 Filed at: 05/25/2025 0852   Risk Factors 0 Filed at: 05/25/2025 0852   Troponin 0 Filed at: 05/25/2025 0852   HEART Score 1 Filed at: 05/25/2025 0852          HEART Risk Score      Flowsheet Row Most Recent Value   Heart Score Risk Calculator    History 1 Filed at: 05/25/2025 0852   ECG 0 Filed at: 05/25/2025 0852   Age 0 Filed at: 05/25/2025 0852   Risk Factors 0 Filed at: 05/25/2025 0852   Troponin 0 Filed at: 05/25/2025 0852   HEART Score  1 Filed at: 05/25/2025 0852                      No data recorded            Wells' Criteria for PE      Flowsheet Row Most Recent Value   Wells' Criteria for PE    Clinical signs and symptoms of DVT 0 Filed at: 05/25/2025 0855   PE is primary diagnosis or equally likely 0 Filed at: 05/25/2025 0855   HR >100 1.5 Filed at: 05/25/2025 0855   Immobilization at least 3 days or Surgery in the previous 4 weeks 0 Filed at: 05/25/2025 0855   Previous, objectively diagnosed PE or DVT 0 Filed at: 05/25/2025 0855   Hemoptysis 0 Filed at: 05/25/2025 0855   Malignancy with treatment within 6 months or palliative 0 Filed at: 05/25/2025 0855   Wells' Criteria Total 1.5 Filed at: 05/25/2025 0855                        History of Present Illness       Chief Complaint   Patient presents with    Chest Pain     Substernal chest pain radiating to right shoulder and into back, described as tightness or pressure       Past Medical History[1]   Past Surgical History[2]   Family History[3]   Social History[4]   E-Cigarette/Vaping    E-Cigarette Use Never User       E-Cigarette/Vaping Substances    Nicotine No     THC No     CBD No     Flavoring No     Other No     Unknown No       I have reviewed and agree with the history as documented.     So Seymour is a 39 y.o. female with history of chronic back pain, migraines, myofascial pain presenting for right-sided chest pain.    Reports waking from sleep at 6 am with right-sided chest pain, described as shredding. No specifically pleuritic, not worse with exertion or ambulation. Somewhat worse with range of right shoulder. No nausea, vomiting, diarrhea, fevers. No cardiac history, no GI history. No medications taken prior to presentation. No known injuries. No tobacco use, recreational drug use, social alcohol use.      Chest Pain  Associated symptoms: no abdominal pain, no back pain, no cough, no dizziness, no dysphagia, no fever, no headache, no nausea, no palpitations, no shortness of  breath, not vomiting and no weakness        Review of Systems   Constitutional:  Negative for chills and fever.   HENT:  Negative for congestion, hearing loss and trouble swallowing.    Eyes:  Negative for pain and visual disturbance.   Respiratory:  Negative for cough and shortness of breath.    Cardiovascular:  Positive for chest pain. Negative for palpitations and leg swelling.   Gastrointestinal:  Negative for abdominal pain, constipation, diarrhea, nausea and vomiting.   Genitourinary:  Negative for dysuria, frequency and hematuria.   Musculoskeletal:  Negative for arthralgias and back pain.   Skin:  Negative for color change and rash.   Neurological:  Negative for dizziness, seizures, syncope, weakness, light-headedness and headaches.   Psychiatric/Behavioral:  Negative for dysphoric mood.    All other systems reviewed and are negative.          Objective       ED Triage Vitals   Temperature Pulse Blood Pressure Respirations SpO2 Patient Position - Orthostatic VS   05/25/25 0806 05/25/25 0808 05/25/25 0808 05/25/25 0808 05/25/25 0808 05/25/25 0808   99.5 °F (37.5 °C) (!) 106 131/90 20 100 % Sitting      Temp Source Heart Rate Source BP Location FiO2 (%) Pain Score    05/25/25 0806 05/25/25 0808 05/25/25 0808 -- 05/25/25 0806    Oral Monitor Right arm  6      Vitals      Date and Time Temp Pulse SpO2 Resp BP Pain Score FACES Pain Rating User   05/25/25 0943 -- 81 97 % 16 118/82 -- -- LC   05/25/25 0911 98 °F (36.7 °C) -- -- -- -- -- -- LC   05/25/25 0808 -- 106 100 % 20 131/90 -- -- HVL   05/25/25 0806 99.5 °F (37.5 °C) -- -- -- -- 6 -- HVL            Physical Exam  Vitals and nursing note reviewed.   Constitutional:       General: She is not in acute distress.     Appearance: She is well-developed.   HENT:      Head: Normocephalic and atraumatic.      Mouth/Throat:      Mouth: Mucous membranes are moist.      Pharynx: Oropharynx is clear.     Eyes:      Extraocular Movements: Extraocular movements intact.       Conjunctiva/sclera: Conjunctivae normal.      Pupils: Pupils are equal, round, and reactive to light.       Cardiovascular:      Rate and Rhythm: Normal rate and regular rhythm.      Pulses: Normal pulses.      Heart sounds: Normal heart sounds.   Pulmonary:      Effort: Pulmonary effort is normal. No respiratory distress.      Breath sounds: Normal breath sounds. No wheezing, rhonchi or rales.   Chest:      Chest wall: Tenderness present.   Abdominal:      General: Abdomen is flat. There is no distension.      Palpations: Abdomen is soft.      Tenderness: There is no right CVA tenderness, left CVA tenderness, guarding or rebound.     Musculoskeletal:         General: Tenderness (Mild tenderness to palpation over right chest wall) present. No swelling. Normal range of motion.      Cervical back: Normal range of motion.      Right lower leg: No edema.      Left lower leg: No edema.      Comments: Some tenderness with motion of right arm     Skin:     General: Skin is warm and dry.      Capillary Refill: Capillary refill takes less than 2 seconds.      Findings: No rash.     Neurological:      General: No focal deficit present.      Mental Status: She is alert and oriented to person, place, and time.      Sensory: No sensory deficit.      Motor: No weakness.     Psychiatric:         Mood and Affect: Mood normal.         Behavior: Behavior normal.         Results Reviewed       Procedure Component Value Units Date/Time    Lipase [[id number]]  (Normal) Collected: 05/25/25 0814    Lab Status: Final result Specimen: Blood from Arm, Left Updated: 05/25/25 1142     Lipase 48 u/L     HS Troponin I 2hr [[id number]] Collected: 05/25/25 1010    Lab Status: Final result Specimen: Blood from Arm, Left Updated: 05/25/25 1056     hs TnI 2hr <2 ng/L      Delta 2hr hsTnI --    D-dimer, quantitative [[id number]]  (Normal) Collected: 05/25/25 0855    Lab Status: Final result Specimen: Blood from Arm, Left Updated: 05/25/25 0963      D-Dimer, Quant <0.27 ug/ml FEU     POCT pregnancy, urine [[id number]]  (Normal) Collected: 05/25/25 0921    Lab Status: Final result Updated: 05/25/25 0921     EXT Preg Test, Ur Negative     Control Valid    HS Troponin 0hr (reflex protocol) [[id number]]  (Normal) Collected: 05/25/25 0814    Lab Status: Final result Specimen: Blood from Arm, Left Updated: 05/25/25 0848     hs TnI 0hr <2 ng/L     Comprehensive metabolic panel [[id number]] Collected: 05/25/25 0814    Lab Status: Final result Specimen: Blood from Arm, Left Updated: 05/25/25 0844     Sodium 139 mmol/L      Potassium 4.1 mmol/L      Chloride 106 mmol/L      CO2 24 mmol/L      ANION GAP 9 mmol/L      BUN 11 mg/dL      Creatinine 0.78 mg/dL      Glucose 94 mg/dL      Calcium 9.6 mg/dL      AST 17 U/L      ALT 20 U/L      Alkaline Phosphatase 45 U/L      Total Protein 7.7 g/dL      Albumin 4.5 g/dL      Total Bilirubin 0.56 mg/dL      eGFR 96 ml/min/1.73sq m     Narrative:      [employer] guidelines for Chronic Kidney Disease (CKD):     Stage 1 with normal or high GFR (GFR > 90 mL/min/1.73 square meters)    Stage 2 Mild CKD (GFR = 60-89 mL/min/1.73 square meters)    Stage 3A Moderate CKD (GFR = 45-59 mL/min/1.73 square meters)    Stage 3B Moderate CKD (GFR = 30-44 mL/min/1.73 square meters)    Stage 4 Severe CKD (GFR = 15-29 mL/min/1.73 square meters)    Stage 5 End Stage CKD (GFR <15 mL/min/1.73 square meters)  Note: GFR calculation is accurate only with a steady state creatinine    CBC and differential [[id number]] Collected: 05/25/25 0814    Lab Status: Final result Specimen: Blood from Arm, Left Updated: 05/25/25 0830     WBC 6.81 Thousand/uL      RBC 4.66 Million/uL      Hemoglobin 15.0 g/dL      Hematocrit 43.7 %      MCV 94 fL      MCH 32.2 pg      MCHC 34.3 g/dL      RDW 12.9 %      MPV 10.4 fL      Platelets 237 Thousands/uL      nRBC 0 /100 WBCs      Segmented % 47 %      Immature Grans % 0 %      Lymphocytes % 44 %       Monocytes % 6 %      Eosinophils Relative 3 %      Basophils Relative 0 %      Absolute Neutrophils 3.18 Thousands/µL      Absolute Immature Grans 0.02 Thousand/uL      Absolute Lymphocytes 3.00 Thousands/µL      Absolute Monocytes 0.39 Thousand/µL      Eosinophils Absolute 0.19 Thousand/µL      Basophils Absolute 0.03 Thousands/µL             XR chest 2 views   ED Interpretation by Germania Farrar MD (05/25 9222)   Per my interpretation: No acute cardiopulmonary disease      Final Interpretation by Clarence Jackson MD (05/25 9136)      No acute cardiopulmonary disease.            Workstation performed: [id number]             ECG 12 Lead Documentation Only    Date/Time: 5/25/2025 8:26 AM    Performed by: Germania Farrar MD  Authorized by: Germania Farrar MD    Indications / Diagnosis:  Chest pain  ECG reviewed by me, the ED Provider: yes    Patient location:  ED  Previous ECG:     Previous ECG:  Compared to current    Comparison ECG info:  3/29/2024    Similarity:  No change  Interpretation:     Interpretation: normal    Rate:     ECG rate:  104    ECG rate assessment: tachycardic    Rhythm:     Rhythm: sinus tachycardia    Ectopy:     Ectopy: none    QRS:     QRS axis:  Normal    QRS intervals:  Normal  Conduction:     Conduction: normal    ST segments:     ST segments:  Normal  T waves:     T waves: normal    Comments:      QTc 476      ED Medication and Procedure Management   Prior to Admission Medications   Prescriptions Last Dose Informant Patient Reported? Taking?   Etonogestrel (NEXPLANON SC)  Self Yes No   Sig: Inject under the skin   LORazepam (ATIVAN) 1 mg tablet  Self No No   Sig: Take 1 tablet 45 minutes prior to MRI and may take additional tablet 15 minutes prior to MRI as needed for anxiety   Patient not taking: Reported on 3/13/2025   metoclopramide (Reglan) 10 mg tablet   No No   Sig: Take 1 tablet (10 mg total) by mouth every 6 (six) hours as needed (For headache or nausea)    Patient not taking: Reported on 3/13/2025   naproxen (Naprosyn) 500 mg tablet   No No   Sig: Take 1 tablet (500 mg total) by mouth every 12 (twelve) hours as needed for mild pain or moderate pain (body aches)   Patient not taking: Reported on 3/13/2025   nortriptyline (PAMELOR) 10 mg capsule  Self No No   Sig: Take 1 capsule (10 mg total) by mouth daily at bedtime   sertraline (ZOLOFT) 50 mg tablet   No No   Sig: TAKE 1 AND 1/2 TABLETS DAILY BY MOUTH   tiZANidine (ZANAFLEX) 2 mg tablet  Self No No   Sig: Take 1 tablet (2 mg total) by mouth every 8 (eight) hours as needed for muscle spasms   Patient not taking: Reported on 2/29/2024      Facility-Administered Medications Last Administration Doses Remaining   etonogestrel (NEXPLANON) subdermal implant 68 mg 5/14/2024  9:21 AM         Discharge Medication List as of 5/25/2025 12:03 PM        CONTINUE these medications which have NOT CHANGED    Details   Etonogestrel (NEXPLANON SC) Inject under the skin, Historical Med      LORazepam (ATIVAN) 1 mg tablet Take 1 tablet 45 minutes prior to MRI and may take additional tablet 15 minutes prior to MRI as needed for anxiety, Normal      metoclopramide (Reglan) 10 mg tablet Take 1 tablet (10 mg total) by mouth every 6 (six) hours as needed (For headache or nausea), Starting Mon 2/12/2024, Normal      naproxen (Naprosyn) 500 mg tablet Take 1 tablet (500 mg total) by mouth every 12 (twelve) hours as needed for mild pain or moderate pain (body aches), Starting Mon 2/12/2024, Normal      nortriptyline (PAMELOR) 10 mg capsule Take 1 capsule (10 mg total) by mouth daily at bedtime, Starting Tue 3/14/2023, Normal      sertraline (ZOLOFT) 50 mg tablet TAKE 1 AND 1/2 TABLETS DAILY BY MOUTH, Normal      tiZANidine (ZANAFLEX) 2 mg tablet Take 1 tablet (2 mg total) by mouth every 8 (eight) hours as needed for muscle spasms, Starting Fri 9/29/2023, Normal           No discharge procedures on file.  ED SEPSIS DOCUMENTATION   Time reflects  when diagnosis was documented in both MDM as applicable and the Disposition within this note       Time User Action Codes Description Comment    2025 11:10 AM Germania Farrar Add [R07.9] Chest pain                    [1]   Past Medical History:  Diagnosis Date    Anemia     20 years ago    Concussion     Migraine     Urinary tract infection     In the past     Varicella    [2]   Past Surgical History:  Procedure Laterality Date    FINGER SURGERY Left     [place]  DELIVERY ONLY N/A 2021    Procedure:  SECTION ();  Surgeon: Maria Esther Shen MD;  Location: [facility] ;  Service: Obstetrics    WISDOM TOOTH EXTRACTION Bilateral    [3]   Family History  Problem Relation Name Age of Onset    Hyperlipidemia Mother      Transient ischemic attack Father      Diabetes type II Father      Stroke Father          mini stroke    Dementia Maternal Grandmother      Heart attack Paternal Grandmother      No Known Problems Sister     [4]   Social History  Tobacco Use    Smoking status: Never    Smokeless tobacco: Never   Vaping Use    Vaping status: Never Used   Substance Use Topics    Alcohol use: Yes     Alcohol/week: 28.0 standard drinks of alcohol     Types: 28 Shots of liquor per week     Comment: 3-4 daily -- titos    Drug use: Never        Germania Farrar MD  [phone]

## 2025-05-25 NOTE — ED NOTES
PT awake and alert, no distress noted. No other questions upon d/c.     Melany Weeks, ROBER  05/25/25 6642

## 2025-06-20 DIAGNOSIS — F41.9 ANXIETY: ICD-10-CM
